# Patient Record
Sex: FEMALE | Race: WHITE | Employment: STUDENT | URBAN - NONMETROPOLITAN AREA
[De-identification: names, ages, dates, MRNs, and addresses within clinical notes are randomized per-mention and may not be internally consistent; named-entity substitution may affect disease eponyms.]

---
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
## 2022-01-05 PROBLEM — F41.1 GENERALIZED ANXIETY DISORDER: Status: ACTIVE | Noted: 2022-01-05

## 2022-01-05 PROBLEM — R00.0 TACHYCARDIA: Status: ACTIVE | Noted: 2018-11-29

## 2022-01-05 PROBLEM — E34.9 ENDOCRINE DISORDER: Status: ACTIVE | Noted: 2018-04-17

## 2022-01-05 PROBLEM — F64.0 TRANSSEXUALISM: Status: ACTIVE | Noted: 2019-11-04

## 2022-01-05 PROBLEM — F98.8 ATTENTION DEFICIT DISORDER: Status: ACTIVE | Noted: 2022-01-05

## 2022-01-05 PROBLEM — R45.851 SUICIDAL IDEATION: Status: ACTIVE | Noted: 2022-01-05

## 2023-01-09 ENCOUNTER — OFFICE VISIT (OUTPATIENT)
Dept: PRIMARY CARE CLINIC | Age: 23
End: 2023-01-09
Payer: COMMERCIAL

## 2023-01-09 VITALS
SYSTOLIC BLOOD PRESSURE: 130 MMHG | TEMPERATURE: 98.2 F | HEART RATE: 105 BPM | DIASTOLIC BLOOD PRESSURE: 80 MMHG | BODY MASS INDEX: 23.16 KG/M2 | OXYGEN SATURATION: 98 % | WEIGHT: 139 LBS | HEIGHT: 65 IN

## 2023-01-09 DIAGNOSIS — F41.1 GENERALIZED ANXIETY DISORDER: ICD-10-CM

## 2023-01-09 DIAGNOSIS — F33.1 MODERATE EPISODE OF RECURRENT MAJOR DEPRESSIVE DISORDER (HCC): ICD-10-CM

## 2023-01-09 DIAGNOSIS — Z13.31 POSITIVE DEPRESSION SCREENING: ICD-10-CM

## 2023-01-09 DIAGNOSIS — F90.9 ATTENTION DEFICIT HYPERACTIVITY DISORDER (ADHD), UNSPECIFIED ADHD TYPE: Primary | ICD-10-CM

## 2023-01-09 PROCEDURE — 99214 OFFICE O/P EST MOD 30 MIN: CPT | Performed by: FAMILY MEDICINE

## 2023-01-09 RX ORDER — DESVENLAFAXINE 25 MG/1
25 TABLET, EXTENDED RELEASE ORAL DAILY
Qty: 30 TABLET | Refills: 2 | Status: SHIPPED | OUTPATIENT
Start: 2023-01-09

## 2023-01-09 ASSESSMENT — PATIENT HEALTH QUESTIONNAIRE - PHQ9
10. IF YOU CHECKED OFF ANY PROBLEMS, HOW DIFFICULT HAVE THESE PROBLEMS MADE IT FOR YOU TO DO YOUR WORK, TAKE CARE OF THINGS AT HOME, OR GET ALONG WITH OTHER PEOPLE: 3
SUM OF ALL RESPONSES TO PHQ QUESTIONS 1-9: 21
2. FEELING DOWN, DEPRESSED OR HOPELESS: 3
4. FEELING TIRED OR HAVING LITTLE ENERGY: 3
6. FEELING BAD ABOUT YOURSELF - OR THAT YOU ARE A FAILURE OR HAVE LET YOURSELF OR YOUR FAMILY DOWN: 3
8. MOVING OR SPEAKING SO SLOWLY THAT OTHER PEOPLE COULD HAVE NOTICED. OR THE OPPOSITE, BEING SO FIGETY OR RESTLESS THAT YOU HAVE BEEN MOVING AROUND A LOT MORE THAN USUAL: 1
SUM OF ALL RESPONSES TO PHQ9 QUESTIONS 1 & 2: 6
9. THOUGHTS THAT YOU WOULD BE BETTER OFF DEAD, OR OF HURTING YOURSELF: 3
SUM OF ALL RESPONSES TO PHQ QUESTIONS 1-9: 21
1. LITTLE INTEREST OR PLEASURE IN DOING THINGS: 3
7. TROUBLE CONCENTRATING ON THINGS, SUCH AS READING THE NEWSPAPER OR WATCHING TELEVISION: 2
SUM OF ALL RESPONSES TO PHQ QUESTIONS 1-9: 18
3. TROUBLE FALLING OR STAYING ASLEEP: 2
5. POOR APPETITE OR OVEREATING: 1
SUM OF ALL RESPONSES TO PHQ QUESTIONS 1-9: 21

## 2023-01-09 ASSESSMENT — COLUMBIA-SUICIDE SEVERITY RATING SCALE - C-SSRS
1. WITHIN THE PAST MONTH, HAVE YOU WISHED YOU WERE DEAD OR WISHED YOU COULD GO TO SLEEP AND NOT WAKE UP?: YES
6. HAVE YOU EVER DONE ANYTHING, STARTED TO DO ANYTHING, OR PREPARED TO DO ANYTHING TO END YOUR LIFE?: NO
2. HAVE YOU ACTUALLY HAD ANY THOUGHTS OF KILLING YOURSELF?: NO

## 2023-01-10 ENCOUNTER — TELEPHONE (OUTPATIENT)
Dept: PRIMARY CARE CLINIC | Age: 23
End: 2023-01-10

## 2023-01-10 RX ORDER — PAROXETINE HYDROCHLORIDE 20 MG/1
20 TABLET, FILM COATED ORAL DAILY
Qty: 30 TABLET | Refills: 3 | Status: SHIPPED | OUTPATIENT
Start: 2023-01-10

## 2023-01-10 ASSESSMENT — ENCOUNTER SYMPTOMS
NAUSEA: 0
BACK PAIN: 0
ABDOMINAL PAIN: 0
DIARRHEA: 0
VOMITING: 0
WHEEZING: 0
COLOR CHANGE: 0
COUGH: 0
EYE DISCHARGE: 0

## 2023-01-10 NOTE — TELEPHONE ENCOUNTER
Mom states the new med for Anxiety has caused major reaction after taking 1 pill. Nausea, dizzy, severe mood swings, angry, sad, crying, jittery. Pt asking for different med.

## 2023-01-10 NOTE — PROGRESS NOTES
SUBJECTIVE:    Patient ID: Raissa Rojas is a 25 y.o. female. HPI:   Patient is seen today for problems with depression. He is currently taking Adderall for ADHD. He has been on Cymbalta in the past but states that he came off of it because he did not feel it was anything for him and he had a lot of night sweats. He states that he feels down more significantly over the past couple of months. He states that he has not had any suicidal thoughts or ideation but he just feels like he is down and depressed. He states that he just does not have much motivation to do things and does not feel like he enjoys things like he used to. He is currently working at [employer] which he enjoys. He states that he is still taking his testosterone injections and as well as his Depo. Past Medical History:   Diagnosis Date    Anxiety     Depression     Personality disorder, depressive     Self mutilating behavior       Current Outpatient Medications on File Prior to Visit   Medication Sig Dispense Refill    amphetamine-dextroamphetamine (ADDERALL XR) 30 MG extended release capsule Take one capsule daily 90 capsule 0    B-D 3CC LUER-IRMA SYR 22GX1\" 22G X 1\" 3 ML MISC       ibuprofen (ADVIL;MOTRIN) 800 MG tablet Take 1 tablet by mouth 3 times daily as needed for Pain 90 tablet 0    medroxyPROGESTERone (DEPO-PROVERA) 150 MG/ML injection Inject 150 mg into the muscle every 3 months      Syringe, Disposable, 3 ML MISC Use as directed for monthly testosterone injections. Please dispense with 1 inch needle      testosterone cypionate (DEPOTESTOTERONE CYPIONATE) 200 MG/ML injection Inject 50 mg into the muscle every 14 days. Pink Brandon UNABLE TO FIND Take  by mouth daily. Yesika Thorpe, Vegetable supplement       No current facility-administered medications on file prior to visit.      Allergies   Allergen Reactions    Prozac [Fluoxetine] Other (See Comments)     hallucinations    Milk-Related Compounds     Wheat Dextrin     Zoloft [Sertraline Hcl] Other (See Comments)     Mother states that it made her daughter paranoid. Review of Systems   Constitutional:  Negative for activity change, appetite change and fever. HENT:  Negative for congestion and nosebleeds. Eyes:  Negative for discharge. Respiratory:  Negative for cough and wheezing. Cardiovascular:  Negative for chest pain and leg swelling. Gastrointestinal:  Negative for abdominal pain, diarrhea, nausea and vomiting. Genitourinary:  Negative for difficulty urinating, frequency and urgency. Musculoskeletal:  Negative for back pain and gait problem. Skin:  Negative for color change and rash. Neurological:  Negative for dizziness and headaches. Hematological:  Does not bruise/bleed easily. Psychiatric/Behavioral:  Negative for sleep disturbance and suicidal ideas. OBJECTIVE:    Physical Exam  Vitals reviewed. Constitutional:       General: She is not in acute distress. Appearance: Normal appearance. She is well-developed. She is not diaphoretic. HENT:      Head: Normocephalic and atraumatic. Right Ear: External ear normal.      Left Ear: External ear normal.   Cardiovascular:      Rate and Rhythm: Normal rate and regular rhythm. Pulses: Normal pulses. Heart sounds: Normal heart sounds. No murmur heard. Pulmonary:      Effort: Pulmonary effort is normal. No respiratory distress. Breath sounds: Normal breath sounds. Musculoskeletal:      Cervical back: Normal range of motion and neck supple. Skin:     General: Skin is warm and dry. Neurological:      General: No focal deficit present. Mental Status: She is alert and oriented to person, place, and time. Mental status is at baseline. Psychiatric:         Mood and Affect: Mood normal.         Behavior: Behavior normal.         Thought Content:  Thought content normal.         Judgment: Judgment normal.      /80   Pulse (!) 105   Temp 98.2 °F (36.8 °C)   Ht 5' 5\" (1.651 m) Wt 139 lb (63 kg)   LMP 01/02/2023   SpO2 98%   BMI 23.13 kg/m²      ASSESSMENT/PLAN:    1. Attention deficit hyperactivity disorder (ADHD), unspecified ADHD type  2. Positive depression screening  3. Generalized anxiety disorder  4. Moderate episode of recurrent major depressive disorder (HCC)     We are going to start her on Pristiq. I have encouraged her to let me know we are going to start him on Pristiq. I encouraged him to let me know if he feels like his symptoms are worsening otherwise I would like to see him back in about 4 weeks for follow-up. Continue Adderall XR for ADHD. I have encouraged him to continue to follow-up with counseling. PDMP Monitoring:    Last PDMP Haritha Peterson as Reviewed [facility]):  Review User Review Instant Review Result   Gwendolyn Backer 12/27/2022 12:32 PM Reviewed PDMP [1]       Urine Drug Screenings (1 yr)    No resulted procedures found. Medication Contract and Consent for Opioid Use Documents Filed        No documents found                     EMR Dragon/transcription disclaimer:  Much of this encounter note is electronic transcription/translation of spoken language toprinted texts. The electronic translation of spoken language may be erroneous, or at times, nonsensical words or phrases may be inadvertently transcribed. Although I have reviewed the note for such errors, some may stillexist.PHQ-9 score today: (PHQ-9 Total Score: 21), additional evaluation and assessment performed, follow-up plan includes but not limited to: Medication management and Referral to /Specialist  for evaluation and management.

## 2023-01-19 ENCOUNTER — TELEPHONE (OUTPATIENT)
Dept: PRIMARY CARE CLINIC | Age: 23
End: 2023-01-19

## 2023-01-19 NOTE — TELEPHONE ENCOUNTER
[employer] states the Gene sight you wanted is not covered without a peer to peer if you want but not required.  [phone]

## 2023-01-20 ENCOUNTER — TELEPHONE (OUTPATIENT)
Dept: PRIMARY CARE CLINIC | Age: 23
End: 2023-01-20

## 2023-01-20 NOTE — TELEPHONE ENCOUNTER
Joie from [facility] [employer] wanted to notify you that insurance will not cover the genesight test that was done on the patient due to it still being considered experimental.

## 2023-01-30 ENCOUNTER — OFFICE VISIT (OUTPATIENT)
Dept: PRIMARY CARE CLINIC | Age: 23
End: 2023-01-30
Payer: COMMERCIAL

## 2023-01-30 VITALS
OXYGEN SATURATION: 100 % | DIASTOLIC BLOOD PRESSURE: 86 MMHG | HEART RATE: 115 BPM | TEMPERATURE: 98.2 F | SYSTOLIC BLOOD PRESSURE: 118 MMHG | HEIGHT: 65 IN | BODY MASS INDEX: 22.66 KG/M2 | WEIGHT: 136 LBS

## 2023-01-30 DIAGNOSIS — F90.9 ATTENTION DEFICIT HYPERACTIVITY DISORDER (ADHD), UNSPECIFIED ADHD TYPE: Primary | ICD-10-CM

## 2023-01-30 DIAGNOSIS — F33.1 MODERATE EPISODE OF RECURRENT MAJOR DEPRESSIVE DISORDER (HCC): ICD-10-CM

## 2023-01-30 DIAGNOSIS — F41.1 GENERALIZED ANXIETY DISORDER: ICD-10-CM

## 2023-01-30 DIAGNOSIS — Z13.31 POSITIVE DEPRESSION SCREENING: ICD-10-CM

## 2023-01-30 PROCEDURE — 99213 OFFICE O/P EST LOW 20 MIN: CPT | Performed by: FAMILY MEDICINE

## 2023-01-30 RX ORDER — VILAZODONE HYDROCHLORIDE 10 MG-20MG
KIT ORAL
Qty: 1 KIT | Refills: 0 | Status: SHIPPED | OUTPATIENT
Start: 2023-01-30

## 2023-01-30 ASSESSMENT — PATIENT HEALTH QUESTIONNAIRE - PHQ9
SUM OF ALL RESPONSES TO PHQ QUESTIONS 1-9: 0
SUM OF ALL RESPONSES TO PHQ QUESTIONS 1-9: 22
9. THOUGHTS THAT YOU WOULD BE BETTER OFF DEAD, OR OF HURTING YOURSELF: 3
5. POOR APPETITE OR OVEREATING: 2
SUM OF ALL RESPONSES TO PHQ9 QUESTIONS 1 & 2: 0
10. IF YOU CHECKED OFF ANY PROBLEMS, HOW DIFFICULT HAVE THESE PROBLEMS MADE IT FOR YOU TO DO YOUR WORK, TAKE CARE OF THINGS AT HOME, OR GET ALONG WITH OTHER PEOPLE: 3
2. FEELING DOWN, DEPRESSED OR HOPELESS: 3
1. LITTLE INTEREST OR PLEASURE IN DOING THINGS: 0
SUM OF ALL RESPONSES TO PHQ QUESTIONS 1-9: 22
SUM OF ALL RESPONSES TO PHQ QUESTIONS 1-9: 0
4. FEELING TIRED OR HAVING LITTLE ENERGY: 3
SUM OF ALL RESPONSES TO PHQ9 QUESTIONS 1 & 2: 6
3. TROUBLE FALLING OR STAYING ASLEEP: 3
SUM OF ALL RESPONSES TO PHQ QUESTIONS 1-9: 19
2. FEELING DOWN, DEPRESSED OR HOPELESS: 0
SUM OF ALL RESPONSES TO PHQ QUESTIONS 1-9: 22
6. FEELING BAD ABOUT YOURSELF - OR THAT YOU ARE A FAILURE OR HAVE LET YOURSELF OR YOUR FAMILY DOWN: 2
SUM OF ALL RESPONSES TO PHQ QUESTIONS 1-9: 0
1. LITTLE INTEREST OR PLEASURE IN DOING THINGS: 3
8. MOVING OR SPEAKING SO SLOWLY THAT OTHER PEOPLE COULD HAVE NOTICED. OR THE OPPOSITE, BEING SO FIGETY OR RESTLESS THAT YOU HAVE BEEN MOVING AROUND A LOT MORE THAN USUAL: 1
SUM OF ALL RESPONSES TO PHQ QUESTIONS 1-9: 0
7. TROUBLE CONCENTRATING ON THINGS, SUCH AS READING THE NEWSPAPER OR WATCHING TELEVISION: 2

## 2023-01-30 ASSESSMENT — COLUMBIA-SUICIDE SEVERITY RATING SCALE - C-SSRS
2. HAVE YOU ACTUALLY HAD ANY THOUGHTS OF KILLING YOURSELF?: NO
1. WITHIN THE PAST MONTH, HAVE YOU WISHED YOU WERE DEAD OR WISHED YOU COULD GO TO SLEEP AND NOT WAKE UP?: YES
6. HAVE YOU EVER DONE ANYTHING, STARTED TO DO ANYTHING, OR PREPARED TO DO ANYTHING TO END YOUR LIFE?: NO

## 2023-02-01 ASSESSMENT — ENCOUNTER SYMPTOMS
ABDOMINAL PAIN: 0
VOMITING: 0
WHEEZING: 0
NAUSEA: 0
DIARRHEA: 0
BACK PAIN: 0
EYE DISCHARGE: 0
COUGH: 0
COLOR CHANGE: 0

## 2023-02-01 NOTE — PROGRESS NOTES
SUBJECTIVE:    Patient ID: Willem Ndiaye is a 22 y.o. female.    HPI:   Patient is seen today for medication follow-up.  He is currently doing Paxil.  He does feel like there is been some improvement in the mood with Paxil but he does feel like the fatigue is gotten significantly worse.  He is tolerating the Paxil well other than the fatigue.  He states that he has not had any suicidal thoughts or ideation.  He states that heWould like to try something different if possible because the fatigue is very overwhelming.  He has tried multiple other SSRIs in the past without any improvement in his symptoms and has not like the way he has felt on these previously.    Past Medical History:   Diagnosis Date    Anxiety     Depression     Personality disorder, depressive     Self mutilating behavior       Current Outpatient Medications on File Prior to Visit   Medication Sig Dispense Refill    amphetamine-dextroamphetamine (ADDERALL XR) 30 MG extended release capsule Take one capsule daily 90 capsule 0    B-D 3CC LUER-IRMA SYR 22GX1\" 22G X 1\" 3 ML MISC       ibuprofen (ADVIL;MOTRIN) 800 MG tablet Take 1 tablet by mouth 3 times daily as needed for Pain 90 tablet 0    medroxyPROGESTERone (DEPO-PROVERA) 150 MG/ML injection Inject 150 mg into the muscle every 3 months      Syringe, Disposable, 3 ML MISC Use as directed for monthly testosterone injections.  Please dispense with 1 inch needle      testosterone cypionate (DEPOTESTOTERONE CYPIONATE) 200 MG/ML injection Inject 50 mg into the muscle every 14 days..      UNABLE TO FIND Take  by mouth daily. Dyllan, Vegetable supplement       No current facility-administered medications on file prior to visit.     Allergies   Allergen Reactions    Prozac [Fluoxetine] Other (See Comments)     hallucinations    Milk-Related Compounds     Pristiq [Desvenlafaxine] Other (See Comments)     Mood was worse    Wheat Dextrin     Zoloft [Sertraline Hcl] Other (See Comments)     Mother states that  it made her daughter paranoid. Review of Systems   Constitutional:  Positive for fatigue. Negative for activity change, appetite change and fever. HENT:  Negative for congestion and nosebleeds. Eyes:  Negative for discharge. Respiratory:  Negative for cough and wheezing. Cardiovascular:  Negative for chest pain and leg swelling. Gastrointestinal:  Negative for abdominal pain, diarrhea, nausea and vomiting. Genitourinary:  Negative for difficulty urinating, frequency and urgency. Musculoskeletal:  Negative for back pain and gait problem. Skin:  Negative for color change and rash. Neurological:  Negative for dizziness and headaches. Hematological:  Does not bruise/bleed easily. Psychiatric/Behavioral:  Positive for dysphoric mood. Negative for sleep disturbance and suicidal ideas. The patient is nervous/anxious. OBJECTIVE:    Physical Exam  Vitals reviewed. Constitutional:       General: She is not in acute distress. Appearance: Normal appearance. She is well-developed. She is not diaphoretic. HENT:      Head: Normocephalic and atraumatic. Right Ear: External ear normal.      Left Ear: External ear normal.   Cardiovascular:      Rate and Rhythm: Normal rate and regular rhythm. Pulses: Normal pulses. Heart sounds: Normal heart sounds. No murmur heard. Pulmonary:      Effort: Pulmonary effort is normal. No respiratory distress. Breath sounds: Normal breath sounds. Musculoskeletal:      Cervical back: Normal range of motion and neck supple. Skin:     General: Skin is warm and dry. Neurological:      General: No focal deficit present. Mental Status: She is alert and oriented to person, place, and time. Mental status is at baseline. Psychiatric:         Mood and Affect: Mood normal.         Behavior: Behavior normal.         Thought Content:  Thought content normal.         Judgment: Judgment normal.      /86   Pulse (!) 115   Temp 98.2 °F (36.8 °C)   Ht 5' 5\" (1.651 m)   Wt 136 lb (61.7 kg)   LMP 01/02/2023   SpO2 100%   BMI 22.63 kg/m²      ASSESSMENT/PLAN:    1. Attention deficit hyperactivity disorder (ADHD), unspecified ADHD type  2. Positive depression screening  3. Generalized anxiety disorder  4. Moderate episode of recurrent major depressive disorder (HCC)     We are going to stop the Paxil and start Viibryd. I am going to send this to the pharmacy for him. If he feels like this is not helping he is to let us know. If he has problems with the medication sooner he is to let us know that as well. Follow-up with usIn 4 weeks for follow-up unless needed sooner. PDMP Monitoring:    Last PDMP [place] [facility] as Reviewed [facility]):  Review User Review Instant Review Result   Giovani Certain 12/27/2022 12:32 PM Reviewed PDMP [1]       Urine Drug Screenings (1 yr)    No resulted procedures found. Medication Contract and Consent for Opioid Use Documents Filed        No documents found                     EMR Dragon/transcription disclaimer:  Much of this encounter note is electronic transcription/translation of spoken language toprinted texts. The electronic translation of spoken language may be erroneous, or at times, nonsensical words or phrases may be inadvertently transcribed. Although I have reviewed the note for such errors, some may stillexist.PHQ-9 score today: (PHQ-9 Total Score: 22), additional evaluation and assessment performed, follow-up plan includes but not limited to: Medication management and Referral to /Specialist  for evaluation and management.

## 2023-03-20 ENCOUNTER — OFFICE VISIT (OUTPATIENT)
Dept: PRIMARY CARE CLINIC | Age: 23
End: 2023-03-20
Payer: COMMERCIAL

## 2023-03-20 VITALS
DIASTOLIC BLOOD PRESSURE: 60 MMHG | HEIGHT: 65 IN | WEIGHT: 140.4 LBS | RESPIRATION RATE: 16 BRPM | OXYGEN SATURATION: 98 % | SYSTOLIC BLOOD PRESSURE: 108 MMHG | BODY MASS INDEX: 23.39 KG/M2 | HEART RATE: 94 BPM | TEMPERATURE: 97.3 F

## 2023-03-20 DIAGNOSIS — F41.1 GENERALIZED ANXIETY DISORDER: ICD-10-CM

## 2023-03-20 DIAGNOSIS — F90.9 ATTENTION DEFICIT HYPERACTIVITY DISORDER (ADHD), UNSPECIFIED ADHD TYPE: Primary | ICD-10-CM

## 2023-03-20 DIAGNOSIS — F33.1 MODERATE EPISODE OF RECURRENT MAJOR DEPRESSIVE DISORDER (HCC): ICD-10-CM

## 2023-03-20 DIAGNOSIS — Z13.31 POSITIVE DEPRESSION SCREENING: ICD-10-CM

## 2023-03-20 PROCEDURE — 99214 OFFICE O/P EST MOD 30 MIN: CPT | Performed by: FAMILY MEDICINE

## 2023-03-20 RX ORDER — DEXTROAMPHETAMINE SACCHARATE, AMPHETAMINE ASPARTATE MONOHYDRATE, DEXTROAMPHETAMINE SULFATE AND AMPHETAMINE SULFATE 7.5; 7.5; 7.5; 7.5 MG/1; MG/1; MG/1; MG/1
CAPSULE, EXTENDED RELEASE ORAL
Qty: 90 CAPSULE | Refills: 0 | Status: SHIPPED | OUTPATIENT
Start: 2023-03-20 | End: 2023-08-14

## 2023-03-20 SDOH — ECONOMIC STABILITY: FOOD INSECURITY: WITHIN THE PAST 12 MONTHS, THE FOOD YOU BOUGHT JUST DIDN'T LAST AND YOU DIDN'T HAVE MONEY TO GET MORE.: NEVER TRUE

## 2023-03-20 SDOH — ECONOMIC STABILITY: INCOME INSECURITY: HOW HARD IS IT FOR YOU TO PAY FOR THE VERY BASICS LIKE FOOD, HOUSING, MEDICAL CARE, AND HEATING?: NOT HARD AT ALL

## 2023-03-20 SDOH — ECONOMIC STABILITY: FOOD INSECURITY: WITHIN THE PAST 12 MONTHS, YOU WORRIED THAT YOUR FOOD WOULD RUN OUT BEFORE YOU GOT MONEY TO BUY MORE.: NEVER TRUE

## 2023-03-20 SDOH — ECONOMIC STABILITY: HOUSING INSECURITY
IN THE LAST 12 MONTHS, WAS THERE A TIME WHEN YOU DID NOT HAVE A STEADY PLACE TO SLEEP OR SLEPT IN A SHELTER (INCLUDING NOW)?: NO

## 2023-03-20 ASSESSMENT — PATIENT HEALTH QUESTIONNAIRE - PHQ9
5. POOR APPETITE OR OVEREATING: 1
3. TROUBLE FALLING OR STAYING ASLEEP: 3
4. FEELING TIRED OR HAVING LITTLE ENERGY: 1
10. IF YOU CHECKED OFF ANY PROBLEMS, HOW DIFFICULT HAVE THESE PROBLEMS MADE IT FOR YOU TO DO YOUR WORK, TAKE CARE OF THINGS AT HOME, OR GET ALONG WITH OTHER PEOPLE: 3
SUM OF ALL RESPONSES TO PHQ QUESTIONS 1-9: 17
SUM OF ALL RESPONSES TO PHQ9 QUESTIONS 1 & 2: 6
2. FEELING DOWN, DEPRESSED OR HOPELESS: 3
9. THOUGHTS THAT YOU WOULD BE BETTER OFF DEAD, OR OF HURTING YOURSELF: 1
SUM OF ALL RESPONSES TO PHQ QUESTIONS 1-9: 17
SUM OF ALL RESPONSES TO PHQ QUESTIONS 1-9: 17
1. LITTLE INTEREST OR PLEASURE IN DOING THINGS: 3
8. MOVING OR SPEAKING SO SLOWLY THAT OTHER PEOPLE COULD HAVE NOTICED. OR THE OPPOSITE, BEING SO FIGETY OR RESTLESS THAT YOU HAVE BEEN MOVING AROUND A LOT MORE THAN USUAL: 1
7. TROUBLE CONCENTRATING ON THINGS, SUCH AS READING THE NEWSPAPER OR WATCHING TELEVISION: 1
6. FEELING BAD ABOUT YOURSELF - OR THAT YOU ARE A FAILURE OR HAVE LET YOURSELF OR YOUR FAMILY DOWN: 3
SUM OF ALL RESPONSES TO PHQ QUESTIONS 1-9: 16

## 2023-03-20 ASSESSMENT — ENCOUNTER SYMPTOMS
COLOR CHANGE: 0
DIARRHEA: 0
VOMITING: 0
ABDOMINAL PAIN: 0
COUGH: 0
NAUSEA: 0
EYE DISCHARGE: 0
BACK PAIN: 0
WHEEZING: 0

## 2023-03-20 ASSESSMENT — COLUMBIA-SUICIDE SEVERITY RATING SCALE - C-SSRS
2. HAVE YOU ACTUALLY HAD ANY THOUGHTS OF KILLING YOURSELF?: NO
6. HAVE YOU EVER DONE ANYTHING, STARTED TO DO ANYTHING, OR PREPARED TO DO ANYTHING TO END YOUR LIFE?: NO
1. WITHIN THE PAST MONTH, HAVE YOU WISHED YOU WERE DEAD OR WISHED YOU COULD GO TO SLEEP AND NOT WAKE UP?: YES

## 2023-03-20 NOTE — PROGRESS NOTES
SUBJECTIVE:    Patient ID: Nancy Gage is a 25 y.o. female. HPI:   Patient is seen today for 6-month follow-up. He has a history of ADHD and is on Adderall XR. He is tolerating this well. He is struggling with his depression. We have tried to send a Viibryd starter pack but insurance would not cover it. On review of the PA it said patient not found in her insurance. We are going to try to resend this and try to resubmit the PA if possible. He has tried multiple other antidepressants including Prozac, Paxil, Effexor, Wellbutrin, Pristiq, Lexapro, Cymbalta and a couple of others without any improvement in symptoms and has not tolerated them well. He is just struggling and feels down most of the time. Past Medical History:   Diagnosis Date    Anxiety     Depression     Personality disorder, depressive     Self mutilating behavior       Current Outpatient Medications on File Prior to Visit   Medication Sig Dispense Refill    B-D 3CC LUER-IRMA SYR 22GX1\" 22G X 1\" 3 ML MISC       medroxyPROGESTERone (DEPO-PROVERA) 150 MG/ML injection Inject 150 mg into the muscle every 3 months      Syringe, Disposable, 3 ML MISC Use as directed for monthly testosterone injections. Please dispense with 1 inch needle      testosterone cypionate (DEPOTESTOTERONE CYPIONATE) 200 MG/ML injection Inject 50 mg into the muscle every 14 days. .       No current facility-administered medications on file prior to visit. Allergies   Allergen Reactions    Prozac [Fluoxetine] Other (See Comments)     hallucinations    Milk-Related Compounds     Pristiq [Desvenlafaxine] Other (See Comments)     Mood was worse    Wheat Dextrin     Zoloft [Sertraline Hcl] Other (See Comments)     Mother states that it made her daughter paranoid. Review of Systems   Constitutional:  Negative for activity change, appetite change and fever. HENT:  Negative for congestion and nosebleeds. Eyes:  Negative for discharge.    Respiratory:

## 2023-03-20 NOTE — PROGRESS NOTES
PHQ-9 score today: (PHQ-9 Total Score: 17), additional evaluation and assessment performed, follow-up plan includes but not limited to: Medication management and Referral to /Specialist  for evaluation and management.

## 2023-05-08 ENCOUNTER — OFFICE VISIT (OUTPATIENT)
Dept: PRIMARY CARE CLINIC | Age: 23
End: 2023-05-08

## 2023-05-08 VITALS
HEART RATE: 71 BPM | DIASTOLIC BLOOD PRESSURE: 72 MMHG | OXYGEN SATURATION: 98 % | WEIGHT: 141 LBS | BODY MASS INDEX: 23.49 KG/M2 | TEMPERATURE: 97 F | HEIGHT: 65 IN | RESPIRATION RATE: 16 BRPM | SYSTOLIC BLOOD PRESSURE: 124 MMHG

## 2023-05-08 DIAGNOSIS — F90.9 ATTENTION DEFICIT HYPERACTIVITY DISORDER (ADHD), UNSPECIFIED ADHD TYPE: ICD-10-CM

## 2023-05-08 DIAGNOSIS — Z79.899 MEDICATION MANAGEMENT: ICD-10-CM

## 2023-05-08 DIAGNOSIS — Z13.31 POSITIVE DEPRESSION SCREENING: Primary | ICD-10-CM

## 2023-05-08 DIAGNOSIS — F33.1 MODERATE EPISODE OF RECURRENT MAJOR DEPRESSIVE DISORDER (HCC): ICD-10-CM

## 2023-05-08 RX ORDER — VILAZODONE HYDROCHLORIDE 10 MG-20MG
KIT ORAL
Qty: 1 KIT | Refills: 0 | Status: SHIPPED | OUTPATIENT
Start: 2023-05-08

## 2023-05-08 ASSESSMENT — PATIENT HEALTH QUESTIONNAIRE - PHQ9
4. FEELING TIRED OR HAVING LITTLE ENERGY: 3
1. LITTLE INTEREST OR PLEASURE IN DOING THINGS: 3
SUM OF ALL RESPONSES TO PHQ QUESTIONS 1-9: 20
SUM OF ALL RESPONSES TO PHQ QUESTIONS 1-9: 20
SUM OF ALL RESPONSES TO PHQ9 QUESTIONS 1 & 2: 6
2. FEELING DOWN, DEPRESSED OR HOPELESS: 3
8. MOVING OR SPEAKING SO SLOWLY THAT OTHER PEOPLE COULD HAVE NOTICED. OR THE OPPOSITE, BEING SO FIGETY OR RESTLESS THAT YOU HAVE BEEN MOVING AROUND A LOT MORE THAN USUAL: 1
9. THOUGHTS THAT YOU WOULD BE BETTER OFF DEAD, OR OF HURTING YOURSELF: 0
6. FEELING BAD ABOUT YOURSELF - OR THAT YOU ARE A FAILURE OR HAVE LET YOURSELF OR YOUR FAMILY DOWN: 3
SUM OF ALL RESPONSES TO PHQ QUESTIONS 1-9: 20
10. IF YOU CHECKED OFF ANY PROBLEMS, HOW DIFFICULT HAVE THESE PROBLEMS MADE IT FOR YOU TO DO YOUR WORK, TAKE CARE OF THINGS AT HOME, OR GET ALONG WITH OTHER PEOPLE: 3
7. TROUBLE CONCENTRATING ON THINGS, SUCH AS READING THE NEWSPAPER OR WATCHING TELEVISION: 3
SUM OF ALL RESPONSES TO PHQ QUESTIONS 1-9: 20
5. POOR APPETITE OR OVEREATING: 2
3. TROUBLE FALLING OR STAYING ASLEEP: 2

## 2023-05-08 ASSESSMENT — COLUMBIA-SUICIDE SEVERITY RATING SCALE - C-SSRS
2. HAVE YOU ACTUALLY HAD ANY THOUGHTS OF KILLING YOURSELF?: NO
6. HAVE YOU EVER DONE ANYTHING, STARTED TO DO ANYTHING, OR PREPARED TO DO ANYTHING TO END YOUR LIFE?: NO
1. WITHIN THE PAST MONTH, HAVE YOU WISHED YOU WERE DEAD OR WISHED YOU COULD GO TO SLEEP AND NOT WAKE UP?: NO

## 2023-05-08 NOTE — PROGRESS NOTES
SUBJECTIVE:    Patient ID: Trev Kilgore is a 25 y.o. female. HPI:   Patient is seen today for problems with anxiety and depression. He feels like his symptoms of gotten worse. He is not currently on anything for depression and feels like he needs something. He is not currently suicidal.  He states that he just feels down and does not have much motivation and does not have any yarely. He is taking Vyvanse but really cannot tell that he is taking anything. He is currently on the 20 mg. He states that he is tolerating this well and denies any side effects to it. He would be interested in increasing the dose. He was on Adderall XR 30 previously. Past Medical History:   Diagnosis Date    ADHD (attention deficit hyperactivity disorder)     Anxiety     Depression     Personality disorder, depressive     Self mutilating behavior       Current Outpatient Medications on File Prior to Visit   Medication Sig Dispense Refill    B-D 3CC LUER-IRMA SYR 22GX1\" 22G X 1\" 3 ML MISC       medroxyPROGESTERone (DEPO-PROVERA) 150 MG/ML injection Inject 1 mL into the muscle every 3 months      Syringe, Disposable, 3 ML MISC Use as directed for monthly testosterone injections. Please dispense with 1 inch needle      testosterone cypionate (DEPOTESTOTERONE CYPIONATE) 200 MG/ML injection Inject 0.25 mLs into the muscle every 14 days. Vilazodone HCl 10 & 20 MG KIT Take as directed (Patient not taking: Reported on 5/8/2023) 1 kit 0    amphetamine-dextroamphetamine (ADDERALL XR) 30 MG extended release capsule Take one capsule daily (Patient not taking: Reported on 5/8/2023) 90 capsule 0     No current facility-administered medications on file prior to visit.      Allergies   Allergen Reactions    Prozac [Fluoxetine] Other (See Comments)     hallucinations    Milk-Related Compounds     Pristiq [Desvenlafaxine] Other (See Comments)     Mood was worse    Wheat Dextrin     Zoloft [Sertraline Hcl] Other (See Comments)     Mother

## 2023-05-09 ASSESSMENT — ENCOUNTER SYMPTOMS
BACK PAIN: 0
VOMITING: 0
NAUSEA: 0
ABDOMINAL PAIN: 0
COLOR CHANGE: 0
WHEEZING: 0
EYE DISCHARGE: 0
COUGH: 0
DIARRHEA: 0

## 2023-05-12 LAB
AMPHET CTO UR CFM-MCNC: POSITIVE NG/ML
ANNOTATION COMMENT IMP: NORMAL
BARBITURATES CTO UR CFM-MCNC: NEGATIVE NG/ML
BENZODIAZ CTO UR CFM-MCNC: NEGATIVE NG/ML
CANNABINOIDS CTO UR CFM-MCNC: POSITIVE NG/ML
COCAINE CTO UR CFM-MCNC: NEGATIVE NG/ML
CREAT UR-MCNC: 114.3 MG/DL (ref 20–400)
METHADONE CTO UR CFM-MCNC: NEGATIVE NG/ML
OPIATES CTO UR CFM-MCNC: NEGATIVE NG/ML
PCP CTO UR CFM-MCNC: NEGATIVE NG/ML
PROPOXYPH CTO UR CFM-MCNC: NEGATIVE NG/ML

## 2023-05-14 LAB
AMPHET UR-MCNC: 2562 NG/ML
MDA UR-MCNC: <200 NG/ML
MDEA UR-MCNC: <200 NG/ML
MDMA UR-MCNC: <200 NG/ML
METHAMPHET UR-MCNC: <200 NG/ML
PHENTERMINE, URINE, QUANTITATIVE: <200 NG/ML

## 2023-05-16 DIAGNOSIS — F90.9 ATTENTION DEFICIT HYPERACTIVITY DISORDER (ADHD), UNSPECIFIED ADHD TYPE: ICD-10-CM

## 2023-06-16 DIAGNOSIS — F90.9 ATTENTION DEFICIT HYPERACTIVITY DISORDER (ADHD), UNSPECIFIED ADHD TYPE: ICD-10-CM

## 2023-06-16 RX ORDER — LISDEXAMFETAMINE DIMESYLATE 40 MG/1
40 CAPSULE ORAL EVERY MORNING
Qty: 30 CAPSULE | Refills: 0 | Status: SHIPPED | OUTPATIENT
Start: 2023-06-16 | End: 2023-07-16

## 2023-07-18 RX ORDER — VILAZODONE HYDROCHLORIDE 20 MG/1
TABLET ORAL
Qty: 30 TABLET | Refills: 2 | Status: SHIPPED | OUTPATIENT
Start: 2023-07-18

## 2023-07-21 DIAGNOSIS — F90.9 ATTENTION DEFICIT HYPERACTIVITY DISORDER (ADHD), UNSPECIFIED ADHD TYPE: ICD-10-CM

## 2023-07-21 RX ORDER — LISDEXAMFETAMINE DIMESYLATE 40 MG/1
40 CAPSULE ORAL EVERY MORNING
Qty: 30 CAPSULE | Refills: 0 | Status: SHIPPED | OUTPATIENT
Start: 2023-07-21 | End: 2023-08-20

## 2023-07-21 NOTE — TELEPHONE ENCOUNTER
PDMP Monitoring:    Last PDMP Patsy Douglas as Reviewed [facility]):  Review User Review Instant Review Result   Cecille Mckay 6/16/2023  1:43 PM Reviewed PDMP [1]     Urine Drug Screenings (1 yr)       Drug Panel 9, Ur Screen w Reflx to Conf  Collected: 5/8/2023  4:02 PM (Final result)                  Medication Contract and Consent for Opioid Use Documents Filed        No documents found
Negative

## 2023-07-31 ENCOUNTER — OFFICE VISIT (OUTPATIENT)
Dept: PRIMARY CARE CLINIC | Age: 23
End: 2023-07-31
Payer: COMMERCIAL

## 2023-07-31 VITALS
DIASTOLIC BLOOD PRESSURE: 84 MMHG | HEART RATE: 111 BPM | BODY MASS INDEX: 23.26 KG/M2 | RESPIRATION RATE: 18 BRPM | SYSTOLIC BLOOD PRESSURE: 106 MMHG | TEMPERATURE: 98.9 F | WEIGHT: 139.6 LBS | OXYGEN SATURATION: 96 % | HEIGHT: 65 IN

## 2023-07-31 DIAGNOSIS — N94.6 DYSMENORRHEA: Primary | ICD-10-CM

## 2023-07-31 DIAGNOSIS — N92.6 IRREGULAR MENSES: ICD-10-CM

## 2023-07-31 PROCEDURE — 99212 OFFICE O/P EST SF 10 MIN: CPT | Performed by: FAMILY MEDICINE

## 2023-07-31 RX ORDER — POLYETHYLENE GLYCOL 3350
17 POWDER (GRAM) MISCELLANEOUS DAILY
COMMUNITY
Start: 2023-07-27 | End: 2023-07-31

## 2023-07-31 RX ORDER — PSEUDOEPHEDRINE HCL 30 MG
100 TABLET ORAL 2 TIMES DAILY PRN
COMMUNITY
Start: 2023-07-27 | End: 2023-07-31

## 2023-08-01 ASSESSMENT — ENCOUNTER SYMPTOMS
EYE DISCHARGE: 0
BACK PAIN: 0
DIARRHEA: 0
COLOR CHANGE: 0
ABDOMINAL PAIN: 0
COUGH: 0
NAUSEA: 0
VOMITING: 0
WHEEZING: 0

## 2023-08-01 NOTE — PROGRESS NOTES
SUBJECTIVE:    Patient ID: Yen Wesley is a 25 y.o. female. HPI:   Patient is seen today for complaints of painful menses and irregular menses. He is currently on Depo and is seeing a specialist in [place] for transgender issues. He is currently on Depo and testosterone. He has not had a transvaginal ultrasound done recently. He has not had a work-up done from a GYN standpoint in the last couple of years. He states that he does not have a gynecologist that he sees. Past Medical History:   Diagnosis Date    ADHD (attention deficit hyperactivity disorder)     Anxiety     Depression     Personality disorder, depressive     Self mutilating behavior       Current Outpatient Medications on File Prior to Visit   Medication Sig Dispense Refill    Lisdexamfetamine Dimesylate (VYVANSE) 40 MG CAPS Take 40 mg by mouth every morning for 30 days. Max Daily Amount: 40 mg 30 capsule 0    B-D 3CC LUER-IRMA SYR 22GX1\" 22G X 1\" 3 ML MISC       medroxyPROGESTERone (DEPO-PROVERA) 150 MG/ML injection Inject 1 mL into the muscle every 3 months      testosterone cypionate (DEPOTESTOTERONE CYPIONATE) 200 MG/ML injection Inject 0.5 mLs into the muscle every 14 days. vilazodone HCl (VIIBRYD) 20 MG TABS TAKE 1/2 TABLET BY MOUTH DAILY FOR 7 DAYS THEN TAKE ONE TABLET BY MOUTH DAILY THEREAFTER 30 tablet 2    Vilazodone HCl (VIIBRYD STARTER PACK) 10 & 20 MG KIT Take as directed 1 kit 0    Vilazodone HCl 10 & 20 MG KIT Take as directed 1 kit 0    amphetamine-dextroamphetamine (ADDERALL XR) 30 MG extended release capsule Take one capsule daily (Patient not taking: Reported on 5/8/2023) 90 capsule 0    Syringe, Disposable, 3 ML MISC Use as directed for monthly testosterone injections. Please dispense with 1 inch needle       No current facility-administered medications on file prior to visit.      Allergies   Allergen Reactions    Prozac [Fluoxetine] Other (See Comments)     hallucinations    Milk-Related Compounds     Pristiq

## 2023-08-08 ENCOUNTER — HOSPITAL ENCOUNTER (OUTPATIENT)
Dept: ULTRASOUND IMAGING | Age: 23
Discharge: HOME | End: 2023-08-08
Payer: COMMERCIAL

## 2023-08-08 DIAGNOSIS — N94.6 DYSMENORRHEA: ICD-10-CM

## 2023-08-08 DIAGNOSIS — N92.6 IRREGULAR MENSES: ICD-10-CM

## 2023-08-08 PROCEDURE — 76830 TRANSVAGINAL US NON-OB: CPT

## 2023-08-30 DIAGNOSIS — F90.9 ATTENTION DEFICIT HYPERACTIVITY DISORDER (ADHD), UNSPECIFIED ADHD TYPE: ICD-10-CM

## 2023-08-30 RX ORDER — LISDEXAMFETAMINE DIMESYLATE 40 MG/1
40 CAPSULE ORAL EVERY MORNING
Qty: 30 CAPSULE | Refills: 0 | Status: SHIPPED | OUTPATIENT
Start: 2023-08-30 | End: 2023-09-29

## 2023-08-30 NOTE — TELEPHONE ENCOUNTER
PDMP Monitoring:    Last PDMP Fuentes Slade as Reviewed [facility]):  Review User Review Instant Review Result   Shadi Pepe 6/16/2023  1:43 PM Reviewed PDMP [1]     Urine Drug Screenings (1 yr)       Drug Panel 9, Ur Screen w Reflx to Conf  Collected: 5/8/2023  4:02 PM (Final result)                  Medication Contract and Consent for Opioid Use Documents Filed        No documents found

## 2023-09-26 DIAGNOSIS — F90.9 ATTENTION DEFICIT HYPERACTIVITY DISORDER (ADHD), UNSPECIFIED ADHD TYPE: ICD-10-CM

## 2023-09-26 RX ORDER — DEXTROAMPHETAMINE SACCHARATE, AMPHETAMINE ASPARTATE MONOHYDRATE, DEXTROAMPHETAMINE SULFATE AND AMPHETAMINE SULFATE 7.5; 7.5; 7.5; 7.5 MG/1; MG/1; MG/1; MG/1
CAPSULE, EXTENDED RELEASE ORAL
Qty: 90 CAPSULE | Refills: 0 | Status: SHIPPED | OUTPATIENT
Start: 2023-09-26 | End: 2024-02-20

## 2023-09-26 NOTE — TELEPHONE ENCOUNTER
Provider needs to review PDMP    PDMP Monitoring:    Last PDMP Truman Young as Reviewed [facility]):  Review User Review Instant Review Result   Neha Deavenkatesh 6/16/2023  1:43 PM Reviewed PDMP [1]     Urine Drug Screenings (1 yr)       Drug Panel 9, Ur Screen w Reflx to Conf  Collected: 5/8/2023  4:02 PM (Final result)                  Medication Contract and Consent for Opioid Use Documents Filed        No documents found

## 2024-01-04 ENCOUNTER — TELEMEDICINE (OUTPATIENT)
Dept: PRIMARY CARE CLINIC | Age: 24
End: 2024-01-04
Payer: COMMERCIAL

## 2024-01-04 DIAGNOSIS — F90.9 ATTENTION DEFICIT HYPERACTIVITY DISORDER (ADHD), UNSPECIFIED ADHD TYPE: ICD-10-CM

## 2024-01-04 PROCEDURE — 99212 OFFICE O/P EST SF 10 MIN: CPT | Performed by: FAMILY MEDICINE

## 2024-01-04 RX ORDER — DEXTROAMPHETAMINE SACCHARATE, AMPHETAMINE ASPARTATE MONOHYDRATE, DEXTROAMPHETAMINE SULFATE AND AMPHETAMINE SULFATE 7.5; 7.5; 7.5; 7.5 MG/1; MG/1; MG/1; MG/1
CAPSULE, EXTENDED RELEASE ORAL
Qty: 90 CAPSULE | Refills: 0 | Status: SHIPPED | OUTPATIENT
Start: 2024-01-04 | End: 2024-05-30

## 2024-01-04 ASSESSMENT — PATIENT HEALTH QUESTIONNAIRE - PHQ9
3. TROUBLE FALLING OR STAYING ASLEEP: 0
SUM OF ALL RESPONSES TO PHQ9 QUESTIONS 1 & 2: 0
10. IF YOU CHECKED OFF ANY PROBLEMS, HOW DIFFICULT HAVE THESE PROBLEMS MADE IT FOR YOU TO DO YOUR WORK, TAKE CARE OF THINGS AT HOME, OR GET ALONG WITH OTHER PEOPLE: 0
SUM OF ALL RESPONSES TO PHQ QUESTIONS 1-9: 0
4. FEELING TIRED OR HAVING LITTLE ENERGY: 0
5. POOR APPETITE OR OVEREATING: 0
SUM OF ALL RESPONSES TO PHQ QUESTIONS 1-9: 0
SUM OF ALL RESPONSES TO PHQ QUESTIONS 1-9: 0
7. TROUBLE CONCENTRATING ON THINGS, SUCH AS READING THE NEWSPAPER OR WATCHING TELEVISION: 0
8. MOVING OR SPEAKING SO SLOWLY THAT OTHER PEOPLE COULD HAVE NOTICED. OR THE OPPOSITE, BEING SO FIGETY OR RESTLESS THAT YOU HAVE BEEN MOVING AROUND A LOT MORE THAN USUAL: 0
9. THOUGHTS THAT YOU WOULD BE BETTER OFF DEAD, OR OF HURTING YOURSELF: 0
1. LITTLE INTEREST OR PLEASURE IN DOING THINGS: 0
6. FEELING BAD ABOUT YOURSELF - OR THAT YOU ARE A FAILURE OR HAVE LET YOURSELF OR YOUR FAMILY DOWN: 0
SUM OF ALL RESPONSES TO PHQ QUESTIONS 1-9: 0
2. FEELING DOWN, DEPRESSED OR HOPELESS: 0

## 2024-01-04 ASSESSMENT — ENCOUNTER SYMPTOMS
ABDOMINAL PAIN: 0
BACK PAIN: 0
DIARRHEA: 0
COUGH: 0
VOMITING: 0
COLOR CHANGE: 0
NAUSEA: 0
EYE DISCHARGE: 0
WHEEZING: 0

## 2024-01-04 NOTE — PROGRESS NOTES
Objective   Patient-Reported Vitals  Patient-Reported Weight: 145 lb  Patient-Reported Height: 5' 5''       Physical Exam  [INSTRUCTIONS:  \"[x]\" Indicates a positive item  \"[]\" Indicates a negative item  -- DELETE ALL ITEMS NOT EXAMINED]    Constitutional: [x] Appears well-developed and well-nourished [x] No apparent distress      [] Abnormal -     Mental status: [x] Alert and awake  [x] Oriented to person/place/time [x] Able to follow commands    [] Abnormal -     Eyes:   EOM    [x]  Normal    [] Abnormal -   Sclera  [x]  Normal    [] Abnormal -          Discharge [x]  None visible   [] Abnormal -     HENT: [x] Normocephalic, atraumatic  [] Abnormal -   [x] Mouth/Throat: Mucous membranes are moist    External Ears [x] Normal  [] Abnormal -    Neck: [x] No visualized mass [] Abnormal -     Pulmonary/Chest: [x] Respiratory effort normal   [x] No visualized signs of difficulty breathing or respiratory distress        [] Abnormal -      Musculoskeletal:   [x] Normal gait with no signs of ataxia         [x] Normal range of motion of neck        [] Abnormal -     Neurological:        [x] No Facial Asymmetry (Cranial nerve 7 motor function) (limited exam due to video visit)          [x] No gaze palsy        [] Abnormal -          Skin:        [x] No significant exanthematous lesions or discoloration noted on facial skin         [] Abnormal -            Psychiatric:       [x] Normal Affect [] Abnormal -        [x] No Hallucinations    Other pertinent observable physical exam findings:-             --Miracle Escobar MD

## 2024-01-29 ENCOUNTER — HOSPITAL ENCOUNTER (INPATIENT)
Age: 24
LOS: 3 days | Discharge: HOME | End: 2024-02-01
Attending: EMERGENCY MEDICINE | Admitting: PSYCHIATRY & NEUROLOGY
Payer: COMMERCIAL

## 2024-01-29 DIAGNOSIS — R44.3 HALLUCINATIONS: Primary | ICD-10-CM

## 2024-01-29 DIAGNOSIS — R45.851 DEPRESSION WITH SUICIDAL IDEATION: ICD-10-CM

## 2024-01-29 DIAGNOSIS — F32.A DEPRESSION WITH SUICIDAL IDEATION: ICD-10-CM

## 2024-01-29 PROBLEM — F29 PSYCHOSIS, UNSPECIFIED PSYCHOSIS TYPE (HCC): Status: ACTIVE | Noted: 2024-01-29

## 2024-01-29 LAB
ALBUMIN SERPL-MCNC: 4.7 G/DL (ref 3.5–5.2)
ALP SERPL-CCNC: 70 U/L (ref 35–104)
ALT SERPL-CCNC: 10 U/L (ref 5–33)
AMPHET UR QL SCN: POSITIVE
ANION GAP SERPL CALCULATED.3IONS-SCNC: 10 MMOL/L (ref 7–19)
APAP SERPL-MCNC: <5 UG/ML (ref 10–30)
AST SERPL-CCNC: 16 U/L (ref 5–32)
BACTERIA URNS QL MICRO: NEGATIVE /HPF
BARBITURATES UR QL SCN: NEGATIVE
BASOPHILS # BLD: 0 K/UL (ref 0–0.2)
BASOPHILS NFR BLD: 0.4 % (ref 0–1)
BENZODIAZ UR QL SCN: NEGATIVE
BILIRUB SERPL-MCNC: 1.5 MG/DL (ref 0.2–1.2)
BILIRUB UR QL STRIP: NEGATIVE
BUN SERPL-MCNC: 6 MG/DL (ref 6–20)
BUPRENORPHINE URINE: NEGATIVE
CALCIUM SERPL-MCNC: 9.7 MG/DL (ref 8.6–10)
CANNABINOIDS UR QL SCN: POSITIVE
CHLORIDE SERPL-SCNC: 103 MMOL/L (ref 98–111)
CLARITY UR: CLEAR
CO2 SERPL-SCNC: 28 MMOL/L (ref 22–29)
COCAINE UR QL SCN: NEGATIVE
COLOR UR: YELLOW
CREAT SERPL-MCNC: 0.8 MG/DL (ref 0.5–0.9)
CRYSTALS URNS MICRO: ABNORMAL /HPF
DRUG SCREEN COMMENT UR-IMP: ABNORMAL
EOSINOPHIL # BLD: 0.1 K/UL (ref 0–0.6)
EOSINOPHIL NFR BLD: 1.1 % (ref 0–5)
EPI CELLS #/AREA URNS AUTO: 5 /HPF (ref 0–5)
ERYTHROCYTE [DISTWIDTH] IN BLOOD BY AUTOMATED COUNT: 15.5 % (ref 11.5–14.5)
ETHANOLAMINE SERPL-MCNC: <10 MG/DL (ref 0–0.08)
FENTANYL SCREEN, URINE: NEGATIVE
GLUCOSE SERPL-MCNC: 104 MG/DL (ref 74–109)
GLUCOSE UR STRIP.AUTO-MCNC: NEGATIVE MG/DL
HCG SERPL QL: NEGATIVE
HCT VFR BLD AUTO: 41.3 % (ref 37–47)
HGB BLD-MCNC: 13.6 G/DL (ref 12–16)
HGB UR STRIP.AUTO-MCNC: NEGATIVE MG/L
HYALINE CASTS #/AREA URNS AUTO: 0 /HPF (ref 0–8)
IMM GRANULOCYTES # BLD: 0 K/UL
KETONES UR STRIP.AUTO-MCNC: NEGATIVE MG/DL
LEUKOCYTE ESTERASE UR QL STRIP.AUTO: ABNORMAL
LYMPHOCYTES # BLD: 2.2 K/UL (ref 1.1–4.5)
LYMPHOCYTES NFR BLD: 39.4 % (ref 20–40)
MCH RBC QN AUTO: 28.8 PG (ref 27–31)
MCHC RBC AUTO-ENTMCNC: 32.9 G/DL (ref 33–37)
MCV RBC AUTO: 87.3 FL (ref 81–99)
METHADONE UR QL SCN: NEGATIVE
METHAMPHETAMINE, URINE: NEGATIVE
MONOCYTES # BLD: 0.4 K/UL (ref 0–0.9)
MONOCYTES NFR BLD: 6.5 % (ref 0–10)
NEUTROPHILS # BLD: 2.9 K/UL (ref 1.5–7.5)
NEUTS SEG NFR BLD: 52.4 % (ref 50–65)
NITRITE UR QL STRIP.AUTO: NEGATIVE
OPIATES UR QL SCN: NEGATIVE
OXYCODONE UR QL SCN: NEGATIVE
PCP UR QL SCN: NEGATIVE
PH UR STRIP.AUTO: 7.5 [PH] (ref 5–8)
PLATELET # BLD AUTO: 311 K/UL (ref 130–400)
PMV BLD AUTO: 9.5 FL (ref 9.4–12.3)
POTASSIUM SERPL-SCNC: 3.6 MMOL/L (ref 3.5–5)
PROT SERPL-MCNC: 7.3 G/DL (ref 6.6–8.7)
PROT UR STRIP.AUTO-MCNC: NEGATIVE MG/DL
RBC # BLD AUTO: 4.73 M/UL (ref 4.2–5.4)
RBC #/AREA URNS AUTO: 4 /HPF (ref 0–4)
SALICYLATES SERPL-MCNC: <0.3 MG/DL (ref 3–10)
SARS-COV-2 RDRP RESP QL NAA+PROBE: NOT DETECTED
SODIUM SERPL-SCNC: 141 MMOL/L (ref 136–145)
SP GR UR STRIP.AUTO: 1 (ref 1–1.03)
TRICYCLIC, URINE: NEGATIVE
UROBILINOGEN UR STRIP.AUTO-MCNC: 0.2 E.U./DL
WBC # BLD AUTO: 5.5 K/UL (ref 4.8–10.8)
WBC #/AREA URNS AUTO: 8 /HPF (ref 0–5)

## 2024-01-29 PROCEDURE — 36415 COLL VENOUS BLD VENIPUNCTURE: CPT

## 2024-01-29 PROCEDURE — G0480 DRUG TEST DEF 1-7 CLASSES: HCPCS

## 2024-01-29 PROCEDURE — 99285 EMERGENCY DEPT VISIT HI MDM: CPT

## 2024-01-29 PROCEDURE — 82077 ASSAY SPEC XCP UR&BREATH IA: CPT

## 2024-01-29 PROCEDURE — 80143 DRUG ASSAY ACETAMINOPHEN: CPT

## 2024-01-29 PROCEDURE — 85025 COMPLETE CBC W/AUTO DIFF WBC: CPT

## 2024-01-29 PROCEDURE — 80179 DRUG ASSAY SALICYLATE: CPT

## 2024-01-29 PROCEDURE — 81001 URINALYSIS AUTO W/SCOPE: CPT

## 2024-01-29 PROCEDURE — 84703 CHORIONIC GONADOTROPIN ASSAY: CPT

## 2024-01-29 PROCEDURE — 6370000000 HC RX 637 (ALT 250 FOR IP): Performed by: PSYCHIATRY & NEUROLOGY

## 2024-01-29 PROCEDURE — 1240000000 HC EMOTIONAL WELLNESS R&B

## 2024-01-29 PROCEDURE — 87635 SARS-COV-2 COVID-19 AMP PRB: CPT

## 2024-01-29 PROCEDURE — 80053 COMPREHEN METABOLIC PANEL: CPT

## 2024-01-29 PROCEDURE — 80307 DRUG TEST PRSMV CHEM ANLYZR: CPT

## 2024-01-29 RX ORDER — ACETAMINOPHEN 325 MG/1
650 TABLET ORAL EVERY 4 HOURS PRN
Status: DISCONTINUED | OUTPATIENT
Start: 2024-01-29 | End: 2024-02-01 | Stop reason: HOSPADM

## 2024-01-29 RX ORDER — POLYETHYLENE GLYCOL 3350 17 G/17G
17 POWDER, FOR SOLUTION ORAL DAILY PRN
Status: DISCONTINUED | OUTPATIENT
Start: 2024-01-29 | End: 2024-02-01 | Stop reason: HOSPADM

## 2024-01-29 RX ORDER — MECOBALAMIN 5000 MCG
5 TABLET,DISINTEGRATING ORAL NIGHTLY
Status: DISCONTINUED | OUTPATIENT
Start: 2024-01-29 | End: 2024-02-01 | Stop reason: HOSPADM

## 2024-01-29 RX ORDER — M-VIT,TX,IRON,MINS/CALC/FOLIC 27MG-0.4MG
1 TABLET ORAL DAILY
COMMUNITY

## 2024-01-29 RX ORDER — HYDROXYZINE HYDROCHLORIDE 25 MG/1
25 TABLET, FILM COATED ORAL 3 TIMES DAILY PRN
Status: DISCONTINUED | OUTPATIENT
Start: 2024-01-29 | End: 2024-02-01 | Stop reason: HOSPADM

## 2024-01-29 RX ORDER — OLANZAPINE 10 MG/1
10 TABLET ORAL ONCE
Status: COMPLETED | OUTPATIENT
Start: 2024-01-29 | End: 2024-01-29

## 2024-01-29 RX ORDER — TRAZODONE HYDROCHLORIDE 50 MG/1
50 TABLET ORAL NIGHTLY
Status: DISCONTINUED | OUTPATIENT
Start: 2024-01-29 | End: 2024-01-30

## 2024-01-29 RX ADMIN — OLANZAPINE 10 MG: 10 TABLET, FILM COATED ORAL at 21:43

## 2024-01-29 RX ADMIN — TRAZODONE HYDROCHLORIDE 50 MG: 50 TABLET ORAL at 21:43

## 2024-01-29 RX ADMIN — Medication 5 MG: at 21:43

## 2024-01-29 SDOH — HEALTH STABILITY: PHYSICAL HEALTH: ON AVERAGE, HOW MANY DAYS PER WEEK DO YOU ENGAGE IN MODERATE TO STRENUOUS EXERCISE (LIKE A BRISK WALK)?: 0 DAYS

## 2024-01-29 SDOH — HEALTH STABILITY: PHYSICAL HEALTH: ON AVERAGE, HOW MANY MINUTES DO YOU ENGAGE IN EXERCISE AT THIS LEVEL?: 0 MIN

## 2024-01-29 ASSESSMENT — LIFESTYLE VARIABLES
HOW MANY STANDARD DRINKS CONTAINING ALCOHOL DO YOU HAVE ON A TYPICAL DAY: 1 OR 2
HOW OFTEN DO YOU HAVE A DRINK CONTAINING ALCOHOL: 2-3 TIMES A WEEK

## 2024-01-29 ASSESSMENT — PATIENT HEALTH QUESTIONNAIRE - PHQ9
SUM OF ALL RESPONSES TO PHQ QUESTIONS 1-9: 20
5. POOR APPETITE OR OVEREATING: 2
9. THOUGHTS THAT YOU WOULD BE BETTER OFF DEAD, OR OF HURTING YOURSELF: 3
6. FEELING BAD ABOUT YOURSELF - OR THAT YOU ARE A FAILURE OR HAVE LET YOURSELF OR YOUR FAMILY DOWN: 3
4. FEELING TIRED OR HAVING LITTLE ENERGY: 2
SUM OF ALL RESPONSES TO PHQ QUESTIONS 1-9: 20
1. LITTLE INTEREST OR PLEASURE IN DOING THINGS: 3
SUM OF ALL RESPONSES TO PHQ QUESTIONS 1-9: 20
8. MOVING OR SPEAKING SO SLOWLY THAT OTHER PEOPLE COULD HAVE NOTICED. OR THE OPPOSITE, BEING SO FIGETY OR RESTLESS THAT YOU HAVE BEEN MOVING AROUND A LOT MORE THAN USUAL: 1
10. IF YOU CHECKED OFF ANY PROBLEMS, HOW DIFFICULT HAVE THESE PROBLEMS MADE IT FOR YOU TO DO YOUR WORK, TAKE CARE OF THINGS AT HOME, OR GET ALONG WITH OTHER PEOPLE: 3
10. IF YOU CHECKED OFF ANY PROBLEMS, HOW DIFFICULT HAVE THESE PROBLEMS MADE IT FOR YOU TO DO YOUR WORK, TAKE CARE OF THINGS AT HOME, OR GET ALONG WITH OTHER PEOPLE: SOMEWHAT DIFFICULT
2. FEELING DOWN, DEPRESSED OR HOPELESS: 3
3. TROUBLE FALLING OR STAYING ASLEEP: 2
7. TROUBLE CONCENTRATING ON THINGS, SUCH AS READING THE NEWSPAPER OR WATCHING TELEVISION: 1
2. FEELING DOWN, DEPRESSED OR HOPELESS: SEVERAL DAYS
SUM OF ALL RESPONSES TO PHQ9 QUESTIONS 1 & 2: 2
SUM OF ALL RESPONSES TO PHQ9 QUESTIONS 1 & 2: 6
SUM OF ALL RESPONSES TO PHQ QUESTIONS 1-9: 17
1. LITTLE INTEREST OR PLEASURE IN DOING THINGS: SEVERAL DAYS

## 2024-01-29 ASSESSMENT — SOCIAL DETERMINANTS OF HEALTH (SDOH)
HOW OFTEN DO YOU GET TOGETHER WITH FRIENDS OR RELATIVES?: THREE TIMES A WEEK
WITHIN THE LAST YEAR, HAVE YOU BEEN AFRAID OF YOUR PARTNER OR EX-PARTNER?: PATIENT DECLINED
IN A TYPICAL WEEK, HOW MANY TIMES DO YOU TALK ON THE PHONE WITH FAMILY, FRIENDS, OR NEIGHBORS?: THREE TIMES A WEEK
ARE YOU MARRIED, WIDOWED, DIVORCED, SEPARATED, NEVER MARRIED, OR LIVING WITH A PARTNER?: NEVER MARRIED
HOW OFTEN DO YOU ATTEND CHURCH OR RELIGIOUS SERVICES?: NEVER
WITHIN THE LAST YEAR, HAVE TO BEEN RAPED OR FORCED TO HAVE ANY KIND OF SEXUAL ACTIVITY BY YOUR PARTNER OR EX-PARTNER?: PATIENT DECLINED
WITHIN THE LAST YEAR, HAVE YOU BEEN HUMILIATED OR EMOTIONALLY ABUSED IN OTHER WAYS BY YOUR PARTNER OR EX-PARTNER?: PATIENT DECLINED
HOW HARD IS IT FOR YOU TO PAY FOR THE VERY BASICS LIKE FOOD, HOUSING, MEDICAL CARE, AND HEATING?: SOMEWHAT HARD
WITHIN THE LAST YEAR, HAVE YOU BEEN KICKED, HIT, SLAPPED, OR OTHERWISE PHYSICALLY HURT BY YOUR PARTNER OR EX-PARTNER?: PATIENT DECLINED
HOW OFTEN DO YOU ATTENT MEETINGS OF THE CLUB OR ORGANIZATION YOU BELONG TO?: NEVER
DO YOU BELONG TO ANY CLUBS OR ORGANIZATIONS SUCH AS CHURCH GROUPS UNIONS, FRATERNAL OR ATHLETIC GROUPS, OR SCHOOL GROUPS?: NO

## 2024-01-29 ASSESSMENT — SLEEP AND FATIGUE QUESTIONNAIRES
SLEEP PATTERN: RESTLESSNESS
DO YOU HAVE DIFFICULTY SLEEPING: YES

## 2024-01-29 NOTE — ED PROVIDER NOTES
[facility] EMERGENCY DEPT  eMERGENCY dEPARTMENT eNCOUnter      Pt Name: Willem Ndiaye  MRN: [id number]  Birthdate 2000  Date of evaluation: 1/29/2024  Provider: Maddi Pham DO    CHIEF COMPLAINT       Chief Complaint   Patient presents with    Mental Health Problem     SI and paranoid delusions for few weeks         HISTORY OF PRESENT ILLNESS   (Location/Symptom, Timing/Onset,Context/Setting, Quality, Duration, Modifying Factors, Severity)  Note limiting factors.   Willem Ndiaye is a 23 y.o. female who presents to the emergency department      The patient is a 23-year-old female who presents the emergency department with her family with a complaint of hallucinations.  Patient says over the last couple weeks she has been having auditory and visual hallucinations.  She says she has felt paranoid.  She has since started to feel suicidal because the symptoms are starting to wear on her.  She says she saw her therapist today and her family found out that she was having suicidal ideations prompting her to come to the emergency department.  Patient denies any specific plan.  She does have a prior history of self cutting behavior but denies any new wounds.  She denies taking too much medication.  She occasionally uses marijuana from the dispensary.  She has been taking her prescribed medications as prescribed.  There are no other complaints or concerns at this time.          NursingNotes were reviewed.    REVIEW OF SYSTEMS    (2-9 systems for level 4, 10 or more for level 5)     As per HPI         PAST MEDICALHISTORY     Past Medical History:   Diagnosis Date    ADHD (attention deficit hyperactivity disorder)     Anxiety     Depression     Personality disorder, depressive     Self mutilating behavior          SURGICAL HISTORY       Past Surgical History:   Procedure Laterality Date    MASTECTOMY, BILATERAL Bilateral 08/02/2022         CURRENT MEDICATIONS     Previous Medications    AMPHETAMINE-DEXTROAMPHETAMINE (ADDERALL XR)  UA NEG (*)     WBC, UA 8 (*)     All other components within normal limits   COVID-19, RAPID   ETHANOL   HCG, SERUM, QUALITATIVE       All other labs were within normal range or not returned as of this dictation.    Prior records reviewed: No recent ED visits for similar complaints    EMERGENCY DEPARTMENT COURSE and DIFFERENTIAL DIAGNOSIS/MDM:   Vitals:    Vitals:    01/29/24 1407   BP: 114/70   Pulse: 90   Resp: 16   Temp: 98.3 °F (36.8 °C)   SpO2: 100%   Weight: 63.5 kg (140 lb)   Height: 1.651 m (5' 5\")       MDM    The patient is a 23-year-old female who presents the emergency department with her family with a complaint of hallucinations.  Patient says over the last couple weeks she has been having auditory and visual hallucinations.  She says she has felt paranoid.  She has since started to feel suicidal because the symptoms are starting to wear on her.  She says she saw her therapist today and her family found out that she was having suicidal ideations prompting her to come to the emergency department.  Patient denies any specific plan.  She does have a prior history of self cutting behavior but denies any new wounds.  She denies taking too much medication.  She occasionally uses marijuana from the dispensary.  She has been taking her prescribed medications as prescribed.  There are no other complaints or concerns at this time.      Prior records reviewed by me as noted above. I independently reviewed the results of the ED workup.  I have reviewed the workup.  Urine drug screen positive for amphetamines and cannabinoids otherwise largely unremarkable for any significant acute abnormality warranting emergent intervention.  Patient medically cleared for psych evaluation.    1745 care signed out to Dr. Estrella awaiting psych consultation            :  Unless otherwise noted below, none     Procedures    FINAL IMPRESSION      1. Hallucinations          DISPOSITION/PLAN   DISPOSITION     pending    PATIENT REFERRED

## 2024-01-29 NOTE — PROGRESS NOTES
[facility] ADULT INITIAL INTAKE ASSESSMENT     1/29/24    Willem Ndiaye ,a 23 y.o. female, presents to the ED for a psychiatric assessment.     ED Arrival time: 1403  ED physician:  Ankit VERA Notification time: In ER   [facility] Assessment start time: 1735  Psychiatrist call time:   Spoke with Dr. Vasquez    Patient is referred by: Family     Reason for visit to ED - Presenting problem:     PT states reason for ED visit, \"My mom brought me here because my therapist told her to\"    Patient is dressed in paper scrubs with 1:1 sitter. At bedside is mom, dad and sister. Patient request for family to leave during assessment. Patient reports that his therapist recommend him to come to the ER. Patient states \"I have been doing pretty bad. I was talking to him about stuff that was going on. About my paranoia and delusions. It made me suicidal. He wanted me to try something for medication.\" Patient is currently taking adderall for ADD. Patient reports being dx with anxiety and depression. Patient reports that he is hearing voices. States \"they are talking about hurting me. I got really scared that someone poisoned our water at home. They have video cameras. I see faces. It is getting worse and worse. I get so scared and it is just a lot.\" Reports that he does not know who the voices are but there are multiple voices. Patient is looking around the room while describing AVH. States that he has been having AVH for a few weeks.  Patient denies synthetic drug use. Patient reports going to the dispensary in [place] for THC and drinks tequila shots/vodka cranberry twice a week. Patient reports hx of admissions at [facility]. Reports being inpatient 4 times and the last admission at age 14 per patient. Patient is paranoid and reports having suicidal thoughts with no plan. Denies HI.     Physician reports \"The patient is a 23-year-old female who presents the emergency department with her family with a complaint of  stress of being here and having to take out my piercing.\"   Depression 1-10:  8  Explain if increased: \"Its ok.\"  Level of function outside hospital decreased: yes   If yes explain: \"I just do what I have to do.\"   Has patient been completing ADL's: yes    Mental Status Evaluation:     Appearance:  younger than stated age   Behavior:  Restless & fidgety   Speech:  normal pitch and normal volume   Mood:  anxious and depressed   Affect:  mood-congruent   Thought Process:  circumstantial   Thought Content:  suicidal   Sensorium:  person, place, situation, day of week, month of year, year, and date   Cognition:  grossly intact   Insight:  fair         Psychiatric Review Of Systems:     Recent Sleep changes: yes-decreased   Recent appetite changes: yes- decreased   Recent weight changes/Pounds gained (+) or lost (-): no     Current living arrangement:Mom   Current Support System:Mom, Dad and Sister   Employment: [employer]     Psychiatric Hospitalizations: Yes   [facility] & [facility]   Outpatient Psychiatric Treatment:[facility]    Family History:    Family history of mental illness: yes- Mom: depression and anxiety, Sister:depression, anxiety, ADHD and Paranoia, Father- unknown   Family members with suicide attempt: no   If yes explain (attempted or completed):    Substance Abuse History:     SBIRT Completed: yes  Brief Intervention completed if needed:  (Yes/No)    Current ETOH LEVELS: <10    ETOH Usage:     Amount drinking daily: occasional, social use    Date of last drink: Reports that he drinks with friends and father a couple times a week   Longest period of sobriety:    Substance/Chemical Abuse/Recreational Drug History:  Substance used: marijuana  Date of last substance use: 01/29/2024- reports daily use   Tobacco Use: no   History of rehab treatment:denies   How many times in rehab:  Last time in rehab:  Family history of substance

## 2024-01-30 PROBLEM — F12.90 CANNABIS USE DISORDER: Status: ACTIVE | Noted: 2024-01-30

## 2024-01-30 PROBLEM — G47.00 INSOMNIA: Status: ACTIVE | Noted: 2024-01-30

## 2024-01-30 PROCEDURE — 1240000000 HC EMOTIONAL WELLNESS R&B

## 2024-01-30 PROCEDURE — 90792 PSYCH DIAG EVAL W/MED SRVCS: CPT | Performed by: NURSE PRACTITIONER

## 2024-01-30 PROCEDURE — 6370000000 HC RX 637 (ALT 250 FOR IP): Performed by: NURSE PRACTITIONER

## 2024-01-30 PROCEDURE — 6370000000 HC RX 637 (ALT 250 FOR IP): Performed by: PSYCHIATRY & NEUROLOGY

## 2024-01-30 RX ORDER — LAMOTRIGINE 25 MG/1
25 TABLET ORAL DAILY
Status: DISCONTINUED | OUTPATIENT
Start: 2024-01-30 | End: 2024-02-01 | Stop reason: HOSPADM

## 2024-01-30 RX ORDER — TRAZODONE HYDROCHLORIDE 50 MG/1
50 TABLET ORAL NIGHTLY PRN
Status: DISCONTINUED | OUTPATIENT
Start: 2024-01-30 | End: 2024-02-01 | Stop reason: HOSPADM

## 2024-01-30 RX ORDER — OLANZAPINE 5 MG/1
7.5 TABLET ORAL NIGHTLY
Status: DISCONTINUED | OUTPATIENT
Start: 2024-01-30 | End: 2024-02-01 | Stop reason: HOSPADM

## 2024-01-30 RX ADMIN — TRAZODONE HYDROCHLORIDE 50 MG: 50 TABLET ORAL at 21:13

## 2024-01-30 RX ADMIN — Medication 5 MG: at 21:14

## 2024-01-30 RX ADMIN — HYDROXYZINE HYDROCHLORIDE 25 MG: 25 TABLET, FILM COATED ORAL at 21:13

## 2024-01-30 RX ADMIN — LAMOTRIGINE 25 MG: 25 TABLET ORAL at 14:27

## 2024-01-30 RX ADMIN — OLANZAPINE 7.5 MG: 5 TABLET, FILM COATED ORAL at 21:14

## 2024-01-30 NOTE — DISCHARGE INSTR - LAB
Use Disorders; MRT   Patients under 18 years old accepted   Accepts Medicaid; Medicare; Private Insurance; Sliding Scale  [facility]   [address] [facility], [address]   [email or web address]   [phone]   8:00 am- 4:30 pm [facility], [facility], Motivational Interviewing, Family Functional Therapy, Trauma Focused Cognitive Behavior Therapy, Parent Child Interaction Therapy  Patients under 18 years old accepted  DCBS/Cabinet Referrals only     [address], [place], [place] [address]  Phone: [phone]  [email or web address]    [phone]   General Behavioral Health:  Medication Assisted Treatment for Opioid Use Disorders; MRT   Patients under 18 years old accepted   Accepts Medicaid; Medicare; Private Insurance; Sliding Scale    [facility]   [address]  [address]   [email or web address] [phone]   Outpatient Behavioral Health   Accepts Medicare Part B Primary/Most Secondary  [facility] in [place], [place],  and [facility] - Find a Provider ([email or web address])   [phone]   Patients under 18 years old accepted   Accepts a majority of Commercial, Medicare, and Medicaid    [facility], [facility]   [address] [facility] [id number] [phone]   Substance Abuse Counseling   Private Pay  [facility]   [address] [facility] [address]   [email or web address]   [phone]   Crisis Trauma, EMDR, Addiction  Patients under 18 years old accepted   Services offered at reduced cost  Elsie Mukherjee [facility]   [address] [facility], [address]   [email or web address]   [phone] Trauma Treatment Specialist, with a focus on Holistic Psychotherapy
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
## 2024-01-30 NOTE — PROGRESS NOTES
[facility] Daily Assessment  Nursing Progress Note    Room: [facility]/611-01   Name: Willem Ndiaye   Age: 23 y.o.   Gender: female   Dx: Psychosis, unspecified psychosis type (HCC)  Precautions: close watch and suicide risk  Inpatient Status: voluntary     SLEEP:  Sleep Quality Good  Sleep Medications: Yes   PRN Sleep Meds: Yes     MEDICAL:  Other PRN Meds: No   Med Compliant: Yes  Accu-Chek: No  Oxygen/CPAP/BiPAP: No  CIWA/CINA: No   PAIN Assessment: none  Side Effects from medication: No    Metabolic Screening:  No results found for: \"LABA1C\"  Lab Results   Component Value Date    CHOL 122 10/02/2014     Lab Results   Component Value Date    TRIG 113 10/02/2014     Lab Results   Component Value Date    HDL 41 10/02/2014     No components found for: \"LDLCAL\"  No components found for: \"LABVLDL\"  Body mass index is 21.9 kg/m².  BP Readings from Last 2 Encounters:   01/30/24 110/73   07/31/23 106/84       Medical Bed:   Is patient in a medical bed? no   If medical bed is in use, has nursing secured room while patient is awake and out of the room? NA  Has safety checks by nursing been completed on the bed/room this shift? yes    Protective Factors:  Patient identifies protective factors with nursing staff as follows:   Identifies reasons for living: Yes   Supportive Social Network or family: Yes    Belief that suicide is immoral/high spirituality: No   Responsibility to family or others/living with family: Yes   Fear of death or dying due to pain and suffering: No   Engaged in work or school: Yes  If Patient is unable to identify, reason why? NA    Depression: 8   Anxiety: 8   SI passive intermittent, contracts for safety.     HI Negative for homicidal ideation        AVH:yes, hears voices at times If Hallucinations are present, describe? Hears voices     Appetite: good   Percent Meals: 75%   Social: Yes   Speech: normal   Appearance: appropriately dressed and healthy looking    GROUP:  Group Participation:  Yes  Participation Quality: Active Listener    Notes:   ALOx4, med compliant, social with peers and attends group.  Appetite good, slept well.  Reports depression and anxiety 8/10,  Hears voices, but improving.  Denies SI, HI, AVH.  Will continue to monitor.      Electronically signed by Dillan Barrera RN on 1/31/24 at 2:47 PM CST

## 2024-01-30 NOTE — PLAN OF CARE
Group Therapy Note     Date: 1/30/2024  Start Time: 1000  End Time:  1045  Number of Participants: 10     Type of Group: Psychotherapy     Patient's Goal: Patient will process emotions and actions and how to relate to other or their with others. Patient discussed open communication and feelings and emotions.     Notes:  Patient attended process group as scheduled, patient identified a issue to work on today regarding how they will interact and relate to others.    Status After Intervention:  Improved     Participation Level: Active Listener     Participation Quality: Appropriate, Attentive, and Sharing        Speech:  normal        Thought Process/Content: Logical        Affective Functioning: Congruent        Mood: euthymic        Level of consciousness:  Alert        Response to Learning: Able to verbalize current knowledge/experience        Endings: None Reported     Modes of Intervention: Education, Support, and Socialization        Discipline Responsible: /Counselor        Signature:  Giuliana Yeung LPC

## 2024-01-30 NOTE — H&P
[facility]    Psychiatric Initial Evaluation    Date of Evaluation:  1/30/2024  Session Type:  56133 Psychiatric Diagnostic Interview Exam   Name:  Willem Ndiaye  Age:  23 y.o.  Sex:  female  Ethnicity:   Primary Care Physician:  Miracle Escobar MD   Patient Care Team:  Patient Care Team:  Miracle Escobar MD as PCP - General (Family Medicine)  [facility]  Chief Complaint: \" I was having paranoid delusions.\"    History of Present Illness    Historian: patient  Complaint Type: anxiety, depression, and sleep disturbance  Course of Symptoms: ongoing  Symptoms Onset: gradual  Onset Approximately: gradual  Precipitating Factors: prescribed adderall and using thc  Severity: moderate  Risk Factors:  history of depression        Patient is a 24 y/o   transgender female to male who presented with psychosis and suicidal ideation. UDS positive for amphetamine and cannabis. BAL negative. He endorses prior inpatient psychiatric hospitalizations and no prior suicide attempts.      Today he reports that for the past 2 weeks he has been having auditory hallucinations of hearing whispers like they are\" fucking with me.\" He reports at times he hears them saying to hurt himself. He also endorses visual hallucinations as seeing  faces peeking around the corner. He is not responding to internal stimuli at this time. His thought process is linear, logical and oriented. He reports that he became tired of hearing the voices and started having suicidal thoughts with no specific plan. He went to his counselor yesterday and told him how he had been feeling who recommended he come to the ER.    Today he admits to \"heavy marijuana use\" for the past 4 years. He started  using cannabis at age 14 and used until age 17. Reports he then stopped from ages [age] and started using again. He reports smoking \"5 blunts per day\" as well as using
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
## 2024-01-30 NOTE — PROGRESS NOTES
Comprehensive Nutrition Assessment    Type and Reason for Visit:  Initial, Consult    Nutrition Recommendations/Plan:   Continue POC     Nutrition Assessment:    Pt tolerates a Regular diet. PO intake of meals is good. BMP is WNL. Consult received for supplements. All supplements contain milk, and pt has an allergy to milk. Continue Regular diet as ordered.    Current Nutrition Intake & Therapies:    Average Meal Intake: %  ADULT DIET; Regular; Safety Tray; Safety Tray (Disposables)    Anthropometric Measures:  Height: 165.1 cm (5' 5\")  Ideal Body Weight (IBW): 125 lbs (57 kg)    Current Body Weight: 59.7 kg (131 lb 9.8 oz)  Current BMI (kg/m2): 21.9  BMI Categories: Normal Weight (BMI 18.5-24.9)    Nutrition Diagnosis:   No nutrition diagnosis at this time     Nutrition Interventions:   Food and/or Nutrient Delivery: Continue Current Diet  Coordination of Nutrition Care: Continue to monitor while inpatient    Goals:  Goals: PO intake 75% or greater, Meet at least 75% of estimated needs    Nutrition Monitoring and Evaluation:   Food/Nutrient Intake Outcomes: Food and Nutrient Intake  Physical Signs/Symptoms Outcomes: Biochemical Data, Weight    Cat Blanco, MS, RD, LD  Contact: [phone]

## 2024-01-30 NOTE — DISCHARGE INSTRUCTIONS
Medications:   Medication Summary Provided.   I understand that I should take only the medications on my list.   --why and when I need to take each medication.   --which side effects to watch for.   --that I should carry my medication information at all times in case of emergency    Situations.   --I will take all medications until discontinued by physician.  I will take all my medications to follow up appointments.   --check with my physician or pharmacist before taking any new medication, over    the counter product or drink alcohol.   --ask about food, drug and dietary supplement interactions.   --discard old lists and update records with medication providers.    Behavior Health Follow Up:  Original Referral Source: ED  Discharge Diagnosis: [unfilled]  Recomendations for Level of Care: Follow Up  Patient Status at Discharge: Stable  [facility]  was:   Aftercare plan faxed:    Faxed by: Social Work staff Giuliana   Date: 24   Time: Faxed by staff  Prescriptions sent with pt.    General Information:   Questions regarding your bill: Call [phone]   Suicide Hotline (Rescue Crisis) 1-610.101.9526   To obtain results of pending studies call Medical Records at: [phone]   For emergencies and 24 hour/7 days a week contact information: [phone]   Financial Resources*    Crisis Resources    [facility] Family Support Office  [facility] [address] [place], [place] [address]  [phone]  [facility]   [address] [place] [address]  Website: [email or web address].wkas-[email or web address]/  [phone]  [employer]   [address] [place], [place] [address]  Website: https://www.abeo/  [phone]  [employer] [facility]  [address] [place], [place] [address]  Website: [email or web address]  [phone]  [facility]  [address] [place], [place] [address]  [phone]  [address]  local Senior Center.    [facility]  Typically serve a daily lunch during the week- contact for meal times  [facility]  [address] [place] [address]  [phone]  Serving a free lunch from 11:00 AM to 1:00 PM Monday through Friday. Facility includes Showers, Laundry Facility, and .  [facility]  [address] [place], [place] [address]  [phone]  [facility]  [address] [place], [place] [address]  [phone]  Serving a free lunch on Sundays  [facility]  [address] [place] [address]  [phone]  [facility]  [facility] [address]  [phone]  Serving a free lunch on Saturdays from 11am-1pm    [facility]   Typically serve a daily meal and serve as point of contact for Meals on Wheels program  [facility]  [address] [place], [place]  [phone]    Food Pantry  Food distribution. Most require person to bring ID/documentation. Contact for information.    [employer]   [address] [place], [place] [address]  [phone]  [address] [address]  [phone]  [facility]   [address] [place], [place] [address]  [phone]  [facility]  [address] [facility] [address] [place] [address]  [phone]  [employer]  [facility] [place] [address]  [email or web address]://[email or web address]   [phone]    [facility] [employer]  Community supported miniature pantry filled with non-perishables. Take what you need.   [facility]  [address] [place], [place] [address] (by the bus stop)  [facility]  [facility] [place], [place] [address] (far left side of front parking  lot)  [facility]   [address] [place], [place] [address] (right side of front

## 2024-01-30 NOTE — PROGRESS NOTES
SW met with patient to complete psychosocial and lifetime CSSR-S on this date. Patients long and short-term goals discussed. Patient voiced understanding. Treatment plan sheet signed. Patient verbalized understanding of the treatment plan. Patient participated in goals and objectives of the treatment plan. Patient discussed safety plan with . SW explained treatment goals with pt. Decreasing depression and anxiety by improvement of positive coping patterns was discussed. Pt acknowledged understanding of treatment goals and signed treatment plan signature sheet.       In the last 6 months has the patient been a danger to self: Yes  In the last 6 months has the patient been a danger to others: No  Legal Guardian/POA: No    Activity Assessment:  Skills:  Talents:  Interests:    Provided patient with [employer] handout entitled \"Quitting Smoking.\"  Reviewed handout with patient: addressing dangers of smoking, developing coping skills, and providing basic information about quitting.       Patient received all components practical counseling of tobacco practical counseling during the hospital stay.
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
## 2024-01-30 NOTE — ED NOTES
ED TO INPATIENT SBAR HANDOFF    Patient Name: Willem Ndiaye   : 2000  23 y.o.   Family/Caregiver Present: Yes  Code Status Order: No Order    C-SSRS: Risk of Suicide: High Risk  Sitter Yes  Restraints:         Situation  Chief Complaint   Patient presents with    Mental Health Problem     SI and paranoid delusions for few weeks     Brief Description of Patient's Condition: Patient is very calm and reserved.  Mental Status: oriented, alert, coherent, logical, thought processes intact, and able to concentrate and follow conversation  Arrived from: home    Imaging:   No orders to display     COVID-19 Results:   Internal Administration   First Dose COVID-19, MODERNA BLUE border, Primary or Immunocompromised, (age 12y+), IM, 100 mcg/0.5mL  2021   Second Dose COVID-19, MODERNA BLUE border, Primary or Immunocompromised, (age 12y+), IM, 100 mcg/0.5mL   04/15/2021       Last COVID Lab SARS-CoV-2, NAAT (no units)   Date Value   2024 Not Detected           Abnormal labs:   Abnormal Labs Reviewed   CBC WITH AUTO DIFFERENTIAL - Abnormal; Notable for the following components:       Result Value    MCHC 32.9 (*)     RDW 15.5 (*)     All other components within normal limits   COMPREHENSIVE METABOLIC PANEL - Abnormal; Notable for the following components:    Total Bilirubin 1.5 (*)     All other components within normal limits   DRUG SCRN, BUPRENORPHINE - Abnormal; Notable for the following components:    Amphetamine Screen, Urine POSITIVE (*)     Cannabinoid Scrn, Ur POSITIVE (*)     All other components within normal limits   URINALYSIS WITH REFLEX TO CULTURE - Abnormal; Notable for the following components:    Leukocyte Esterase, Urine MODERATE (*)     All other components within normal limits   ACETAMINOPHEN LEVEL - Abnormal; Notable for the following components:    Acetaminophen Level <5 (*)     All other components within normal limits   SALICYLATE LEVEL - Abnormal; Notable for the following components:     Salicylate, Serum <0.3 (*)     All other components within normal limits   MICROSCOPIC URINALYSIS - Abnormal; Notable for the following components:    Bacteria, UA NEGATIVE (*)     Crystals, UA NEG (*)     WBC, UA 8 (*)     All other components within normal limits     Background  Allergies:   Allergies   Allergen Reactions    Prozac [Fluoxetine] Other (See Comments)     hallucinations    Milk-Related Compounds     Pristiq [Desvenlafaxine] Other (See Comments)     Mood was worse    Wheat Dextrin     Zoloft [Sertraline Hcl] Other (See Comments)     Mother states that it made her daughter paranoid.        Current Medications:     History:   Past Medical History:   Diagnosis Date    ADHD (attention deficit hyperactivity disorder)     Anxiety     Depression     Personality disorder, depressive     Self mutilating behavior        Assessment  Vitals: Level of Consciousness: Alert (0)   Vitals:    01/29/24 1407   BP: 114/70   Pulse: 90   Resp: 16   Temp: 98.3 °F (36.8 °C)   SpO2: 100%   Weight: 63.5 kg (140 lb)   Height: 1.651 m (5' 5\")     Predictive Model Details   No score data available for Deterioration Index      NPO? No  O2 Flow Rate:      Cardiac Rhythm: NSR   NIH Score: NIH     Active LDA's:    Pertinent or High Risk Medications/Drips: no   If Yes, please provide details:    Blood Product Administration: no  If Yes, please provide details:    Sepsis Risk Score Sepsis Risk Score: 0.38    Admitted with Sepsis? No    Recommendation  Incomplete orders:    Patient Belongings: With security  Additional Comments:    If any further questions, please call Sending RN at [phone]    Electronically signed by: Electronically signed by Donna Kan RN (Dottie) on 1/29/2024 at 7:12 PM
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
## 2024-01-30 NOTE — H&P
Behavioral Services  Medicare Certification Upon Admission    I certify that this patient's inpatient psychiatric hospital admission is medically necessary for:    [x] (1) Treatment which could reasonably be expected to improve this patient's condition,       [x] (2) Or for diagnostic study;     AND     [x](2) The inpatient psychiatric services are provided while the individual is under the care of a physician and are included in the individualized plan of care.    Estimated length of stay/service :3 days-5 weeks    Plan for post-hospital care :tbd    Electronically signed by JUVENAL aGo on 1/30/2024 at 10:44 AM

## 2024-01-30 NOTE — PROGRESS NOTES
Group Note    Date: 01/30/24  Start Time: 8:00 AM   End Time:8:30 AM     Number of Participants: 12    Type of Group: Community/Goal     Patient's Goal:  \"Talking/ introduce myself being open about hallucinations\"    Notes:      Status After Intervention:      Participation Level: Active Listener    Participation Quality: Appropriate    Speech:  normal    Thought Process/Content: Logical    Mood:  Calm    Level of consciousness:  Alert    Response to Learning: Able to verbalize current knowledge/experience    Modes of Intervention: Education and Support    Discipline Responsible: Behavioral Health Technician     Signature:  Sadiq Coronado

## 2024-01-30 NOTE — PLAN OF CARE
Group Therapy Note     Date: 1/30/2024  Start Time: 1100  End Time:  1130  Number of Participants: 10     Type of Group: Psychoeducation     Wellness Binder Information  Module Name:  staying well  Session Number:  1     Patient's Goal:  daily maintenance and coping skills     Notes:  pt was verbally prompted to attend group. Pt refused. Information about stay well was provided.     Status After Intervention:       Participation Level:      Participation Quality:         Speech:          Thought Process/Content:         Affective Functioning:         Mood:         Level of consciousness:          Response to Learning:         Endings:      Modes of Intervention:         Discipline Responsible: Psychoeducational Specialist        Signature:  Lauren Cam

## 2024-01-31 LAB
25(OH)D3 SERPL-MCNC: 20 NG/ML
CHOLEST SERPL-MCNC: 115 MG/DL (ref 160–199)
HBA1C MFR BLD: 5.4 % (ref 4–6)
HDLC SERPL-MCNC: 42 MG/DL (ref 65–121)
LDLC SERPL CALC-MCNC: 59 MG/DL
TRIGL SERPL-MCNC: 69 MG/DL (ref 0–149)
TSH SERPL DL<=0.005 MIU/L-ACNC: 1.05 UIU/ML (ref 0.35–5.5)
VIT B12 SERPL-MCNC: 373 PG/ML (ref 211–946)

## 2024-01-31 PROCEDURE — 83036 HEMOGLOBIN GLYCOSYLATED A1C: CPT

## 2024-01-31 PROCEDURE — 6370000000 HC RX 637 (ALT 250 FOR IP): Performed by: FAMILY MEDICINE

## 2024-01-31 PROCEDURE — 36415 COLL VENOUS BLD VENIPUNCTURE: CPT

## 2024-01-31 PROCEDURE — 6370000000 HC RX 637 (ALT 250 FOR IP): Performed by: NURSE PRACTITIONER

## 2024-01-31 PROCEDURE — 80061 LIPID PANEL: CPT

## 2024-01-31 PROCEDURE — 1240000000 HC EMOTIONAL WELLNESS R&B

## 2024-01-31 PROCEDURE — 6370000000 HC RX 637 (ALT 250 FOR IP): Performed by: PSYCHIATRY & NEUROLOGY

## 2024-01-31 PROCEDURE — 99232 SBSQ HOSP IP/OBS MODERATE 35: CPT | Performed by: NURSE PRACTITIONER

## 2024-01-31 PROCEDURE — 82607 VITAMIN B-12: CPT

## 2024-01-31 PROCEDURE — 82306 VITAMIN D 25 HYDROXY: CPT

## 2024-01-31 PROCEDURE — 84443 ASSAY THYROID STIM HORMONE: CPT

## 2024-01-31 RX ORDER — IBUPROFEN 400 MG/1
400 TABLET ORAL EVERY 6 HOURS PRN
Status: DISCONTINUED | OUTPATIENT
Start: 2024-01-31 | End: 2024-02-01 | Stop reason: HOSPADM

## 2024-01-31 RX ORDER — CHOLECALCIFEROL (VITAMIN D3) 125 MCG
500 CAPSULE ORAL DAILY
Status: DISCONTINUED | OUTPATIENT
Start: 2024-01-31 | End: 2024-02-01 | Stop reason: HOSPADM

## 2024-01-31 RX ORDER — ERGOCALCIFEROL 1.25 MG/1
50000 CAPSULE ORAL WEEKLY
Status: DISCONTINUED | OUTPATIENT
Start: 2024-01-31 | End: 2024-02-01 | Stop reason: HOSPADM

## 2024-01-31 RX ADMIN — LAMOTRIGINE 25 MG: 25 TABLET ORAL at 08:37

## 2024-01-31 RX ADMIN — OLANZAPINE 7.5 MG: 5 TABLET, FILM COATED ORAL at 21:32

## 2024-01-31 RX ADMIN — CYANOCOBALAMIN TAB 500 MCG 500 MCG: 500 TAB at 11:39

## 2024-01-31 RX ADMIN — ERGOCALCIFEROL 50000 UNITS: 1.25 CAPSULE ORAL at 11:39

## 2024-01-31 RX ADMIN — IBUPROFEN 400 MG: 400 TABLET, FILM COATED ORAL at 12:39

## 2024-01-31 RX ADMIN — Medication 5 MG: at 21:32

## 2024-01-31 RX ADMIN — HYDROXYZINE HYDROCHLORIDE 25 MG: 25 TABLET, FILM COATED ORAL at 18:27

## 2024-01-31 ASSESSMENT — PAIN DESCRIPTION - LOCATION: LOCATION: BACK

## 2024-01-31 ASSESSMENT — PAIN - FUNCTIONAL ASSESSMENT: PAIN_FUNCTIONAL_ASSESSMENT: ACTIVITIES ARE NOT PREVENTED

## 2024-01-31 ASSESSMENT — PAIN DESCRIPTION - DESCRIPTORS: DESCRIPTORS: ACHING

## 2024-01-31 ASSESSMENT — PAIN SCALES - GENERAL: PAINLEVEL_OUTOF10: 5

## 2024-01-31 ASSESSMENT — PAIN DESCRIPTION - ORIENTATION: ORIENTATION: RIGHT;LEFT;LOWER

## 2024-01-31 NOTE — PROGRESS NOTES
[facility]      Psychiatric Progress Note    Name:  Willem Ndiaye  Date:  1/31/2024  Age:  23 y.o.  Sex:  female  Ethnicity:   Primary Care Physician:  Miracle Escobar MD   Patient Care Team:  Patient Care Team:  Miracle Escobar MD as PCP - General (Family Medicine)  Miracle Escobar MD as PCP - Empaneled Provider  Chief Complaint: \" I feel like I am getting better.\"        Historian:patient  Complaint Type: anxiety, depression, illegal drug usage, loss of interest in favorite activities, and sleep disturbance  Course of Symptoms: improved  Precipitating Factors: severe thc use     Subjective  Nursing notes were reviewed and patient had no behavioral issues during the night. As needed medications administered during the night include Trazodone and Hydroxyzine. Today he denies suicidal ideation and homicidal ideation. He reports that he heard \"a little whisper\" this morning. He feels that auditory hallucinations are improving. He had a visit from his family last night which went well. Reports his family is supportive. He rates depression as a 6 and anxiety as a 4 on a 0-10 scale. He has been social other peers and has been playing card with peers in the milieu.  Patient reports sleep as,\" I am sleeping a lot  better today.\" Patient has been calm and cooperative with staff and peers. Patient has been compliant with medications. Patient has been attending groups. Patient reports appetite as,\" I am eating a lot better now.\" Patient reports no side effects from medications.      Objective  Vitals:    01/31/24 0733   BP: 105/71   Pulse: 68   Resp: 20   Temp: 97.5 °F (36.4 °C)   SpO2: 99%       Previous Psychiatric/Substance Use History      Medical History:  Past Medical History:   Diagnosis Date    ADHD (attention deficit hyperactivity disorder)     Anxiety     Depression     Personality disorder, depressive     Self mutilating behavior         MOSES

## 2024-01-31 NOTE — PROGRESS NOTES
[facility] Adult Unit Daily Assessment  Nursing Progress Note    Room: [facility]/611-01   Name: Willem Ndiaye   Age: 23 y.o.   Gender: female   Dx: Psychosis, unspecified psychosis type (HCC)  Precautions: close watch and suicide risk  Inpatient Status: voluntary     SLEEP:  Sleep Quality Good  Sleep Medications: Yes   PRN Sleep Meds: Yes     MEDICAL:  Other PRN Meds: Yes, Motrin at 1239     Med Compliant: Yes  Accu-Chek: No  Oxygen/CPAP/BiPAP: No  CIWA/CINA: No   PAIN Assessment: location, back pain  Side Effects from medication: No    Metabolic Screening:  Lab Results   Component Value Date    LABA1C 5.4 01/31/2024     Lab Results   Component Value Date    CHOL 115 (L) 01/31/2024    CHOL 122 10/02/2014     Lab Results   Component Value Date    TRIG 69 01/31/2024    TRIG 113 10/02/2014     Lab Results   Component Value Date    HDL 42 (L) 01/31/2024    HDL 41 10/02/2014     No components found for: \"LDLCAL\"  No components found for: \"LABVLDL\"  Body mass index is 21.9 kg/m².  BP Readings from Last 2 Encounters:   01/31/24 105/71   07/31/23 106/84       Medical Bed:   Is patient in a medical bed? no   If medical bed is in use, has nursing secured room while patient is awake and out of the room? NA  Has safety checks by nursing been completed on the bed/room this shift? yes    Protective Factors:  Patient identifies protective factors with nursing staff as follows:   Identifies reasons for living: Yes   Supportive Social Network or family: Yes    Belief that suicide is immoral/high spirituality: No   Responsibility to family or others/living with family: Yes   Fear of death or dying due to pain and suffering: No   Engaged in work or school: Yes  If Patient is unable to identify, reason why?     Depression: 6   Anxiety: 4   SI denies suicidal ideation   Risk of Suicide: No Risk  HI Negative for homicidal ideation        AVH:yes If Hallucinations are present, describe? Whispers this morning    Appetite: good   Percent Meals: 75%

## 2024-01-31 NOTE — PLAN OF CARE
Problem: Safety - Adult  Goal: Free from fall injury  Outcome: Progressing     Problem: Self Harm/Suicidality  Goal: Will have no self-injury during hospital stay  Description: INTERVENTIONS:  1.  Ensure constant observer at bedside with Q15M safety checks  2.  Maintain a safe environment  3.  Secure patient belongings  4.  Ensure family/visitors adhere to safety recommendations  5.  Ensure safety tray has been added to patient's diet order  6.  Every shift and PRN: Re-assess suicidal risk via Frequent Screener    Outcome: Progressing  Flowsheets (Taken 1/31/2024 1529)  Will have no self-injury during hospital stay:   Ensure constant observer at bedside with Q15M safety checks   Maintain a safe environment   Secure patient belongings   Ensure safety tray has been added to patient's diet order   Ensure family/visitors adhere to safety recommendations   Every shift and PRN: Re-assess suicidal risk via Frequent Screener     Problem: Depression  Goal: Will be euthymic at discharge  Description: INTERVENTIONS:  1. Administer medication as ordered  2. Provide emotional support via 1:1 interaction with staff  3. Encourage involvement in milieu/groups/activities  4. Monitor for social isolation  Outcome: Progressing     Problem: Psychosis  Goal: Will report no hallucinations or delusions  Description: INTERVENTIONS:  1. Administer medication as  ordered  2. Assist with reality testing to support increasing orientation  3. Assess if patient's hallucinations or delusions are encouraging self harm or harm to others and intervene as appropriate  Outcome: Progressing     Problem: Anxiety  Goal: Will report anxiety at manageable levels  Description: INTERVENTIONS:  1. Administer medication as ordered  2. Teach and rehearse alternative coping skills  3. Provide emotional support with 1:1 interaction with staff  1/31/2024 8118 by Dillan Barrera RN  Outcome: Progressing  Flowsheets (Taken 1/31/2024 1529)  Will report anxiety at

## 2024-01-31 NOTE — PROGRESS NOTES
Group Note    Date: 01/30/24  Start Time: 7:15 PM   End Time:7:45 PM     Number of Participants: 13    Type of Group: Wrap-Up     Patient's Goal:  made friends    Notes:  see wrap up sheet    Status After Intervention:  Unchanged    Participation Level: Active Listener    Participation Quality: Appropriate    Speech:  normal    Thought Process/Content: Logical    Mood: anxious    Level of consciousness:  Alert and Oriented x4    Response to Learning: Able to verbalize current knowledge/experience    Modes of Intervention: Education and Support    Discipline Responsible: Registered Nurse     Signature:  Amada Daley RN

## 2024-01-31 NOTE — PLAN OF CARE
Problem: Anxiety  Goal: Will report anxiety at manageable levels  Description: INTERVENTIONS:  1. Administer medication as ordered  2. Teach and rehearse alternative coping skills  3. Provide emotional support with 1:1 interaction with staff  Outcome: Progressing   Group Note    Date: 01/30/24  Start Time: 4:00 PM   End Time:4:30 PM     Number of Participants: 10    Type of Group: Activity     Patient's Goal:  Goal: Motivating patient to interact and express different emotions in group.     Note: Encouraged patient to participate and discuss with group members     Participation Level: Active Listener and Interactive    Participation Quality: Appropriate and Sharing    Speech:  normal    Thought Process/Content: Logical    Mood: calm    Level of consciousness:  Alert    Response to Learning: Able to verbalize current knowledge/experience and Able to verbalize/acknowledge new learning    Modes of Intervention: Education and Support    Discipline Responsible: /Counselor     Signature:  VIKKI Davidson

## 2024-01-31 NOTE — GROUP NOTE
Group Therapy Note    Date: 1/31/2024    Group Start Time: 0800  Group End Time: 0830  Group Topic: Community Meeting    [facility] 6 ADULT Maureen Dixon RN; Evelia Valverde    Group Therapy Note                                                                    Group Note    Date: 01/31/24  Start Time: 8:00 AM   End Time:8:30 AM     Number of Participants: 13    Type of Group: Community/Goal     Patient's Goal: eating more and talking more in group     Notes:      Status After Intervention:  Improved    Participation Level: Active Listener    Participation Quality: Appropriate    Speech:  normal    Thought Process/Content: Logical  Linear    Mood:  calm and cooperative    Level of consciousness:  Alert    Response to Learning: Progressing to goal    Modes of Intervention: Education and Support    Discipline Responsible: Behavioral Health Technician     Signature:  Maureen Copeland RN    Electronically signed by Maureen Copeland RN on 1/31/2024 at 8:52 AM

## 2024-01-31 NOTE — PLAN OF CARE
Problem: Anxiety  Goal: Will report anxiety at manageable levels  Outcome: Progressing      Group Therapy Note     Date: 1/31/2024  Start Time: 1100  End Time:  1130  Number of Participants: 12     Type of Group: Psychoeducation     Wellness Binder Information  Module Name:  emotional wellness  Session Number:  5     Patient's Goal:  obstacles to emotional wellness     Notes:  pt acknowledged negative thinking as an obstacle to emotional wellness.     Status After Intervention:  Improved     Participation Level: Interactive     Participation Quality: Appropriate, Attentive, and Sharing        Speech:  normal        Thought Process/Content: Logical        Affective Functioning: Congruent        Mood: congruent        Level of consciousness:  Alert, Oriented x4, and Attentive        Response to Learning: Able to verbalize current knowledge/experience        Endings: None Reported     Modes of Intervention: Education        Discipline Responsible: Psychoeducational Specialist        Signature:  Lauren Cam

## 2024-01-31 NOTE — PROGRESS NOTES
[facility] Adult Unit Daily Assessment  Nursing Progress Note    Room: [facility]611-01   Name: Willem Ndiaye   Age: 23 y.o.   Gender: female   Dx: Psychosis, unspecified psychosis type (HCC)  Precautions: suicide risk  Inpatient Status: voluntary     SLEEP:  Sleep Quality Fair  Sleep Medications: Yes   PRN Sleep Meds: Yes     MEDICAL:  Other PRN Meds: Yes   Med Compliant: Yes  Accu-Chek: No  Oxygen/CPAP/BiPAP: No  CIWA/CINA: No   PAIN Assessment: none  Side Effects from medication: No    Metabolic Screening:  No results found for: \"LABA1C\"  Lab Results   Component Value Date    CHOL 122 10/02/2014     Lab Results   Component Value Date    TRIG 113 10/02/2014     Lab Results   Component Value Date    HDL 41 10/02/2014     No components found for: \"LDLCAL\"  No components found for: \"LABVLDL\"  Body mass index is 21.9 kg/m².  BP Readings from Last 2 Encounters:   01/30/24 100/71   07/31/23 106/84       Medical Bed:   Is patient in a medical bed? no   If medical bed is in use, has nursing secured room while patient is awake and out of the room? NA  Has safety checks by nursing been completed on the bed/room this shift? yes    Protective Factors:  Patient identifies protective factors with nursing staff as follows:   Identifies reasons for living: Yes   Supportive Social Network or family: Yes    Belief that suicide is immoral/high spirituality: No   Responsibility to family or others/living with family: Yes   Fear of death or dying due to pain and suffering: No   Engaged in work or school: Yes  If Patient is unable to identify, reason why?     Depression: 6   Anxiety: 4   SI denies suicidal ideation   Risk of Suicide: No Risk  HI Negative for homicidal ideation        AVH:no If Hallucinations are present, describe?     Appetite: good   Percent Meals:    Social: Yes   Speech: normal   Appearance: appropriately dressed    GROUP:  Group Participation: Yes  Participation Quality: Minimal    Notes:     Patient has been social and out

## 2024-01-31 NOTE — H&P
HISTORY and PHYSICAL      CHIEF COMPLAINT:  Psychosis, SI    Reason for Admission:  Psychosis, SI    History Obtained From:  patient, chart    HISTORY OF PRESENT ILLNESS:      The patient is a 23 y.o. female who is admitted to the [facility] unit with worsening mood issues. The patient has no c/o chest pain or SOA. No abdominal pain or N/V. No dysuria. No recent fevers. No HA or dizziness.     Past Medical History:        Diagnosis Date    ADHD (attention deficit hyperactivity disorder)     Anxiety     Depression     Personality disorder, depressive     Self mutilating behavior      Past Surgical History:        Procedure Laterality Date    MASTECTOMY, BILATERAL Bilateral 08/02/2022         Medications Prior to Admission:    Medications Prior to Admission: Multiple Vitamins-Minerals (THERAPEUTIC MULTIVITAMIN-MINERALS) tablet, Take 1 tablet by mouth daily  amphetamine-dextroamphetamine (ADDERALL XR) 30 MG extended release capsule, Take one capsule daily  B-D 3CC LUER-IRMA SYR 22GX1\" 22G X 1\" 3 ML MISC,   Syringe, Disposable, 3 ML MISC, Use as directed for monthly testosterone injections.  Please dispense with 1 inch needle  testosterone cypionate (DEPOTESTOTERONE CYPIONATE) 200 MG/ML injection, Inject 0.5 mLs into the muscle every 14 days.    Allergies:  Prozac [fluoxetine], Milk-related compounds, Pristiq [desvenlafaxine], Wheat dextrin, and Zoloft [sertraline hcl]    Social History:   TOBACCO:   reports that she has never smoked. She has never used smokeless tobacco.  ETOH:   reports current alcohol use.  DRUGS:   reports current drug use. Drug: Marijuana (Weed).  MARITAL STATUS:  not   OCCUPATION:  working  Patient currently lives with family       Family History:   History reviewed. No pertinent family history.  REVIEW OF SYSTEMS:  Constitutional: neg  CV: neg  Pulmonary: neg  GI: neg  : neg  Psych: psychosis. SI  Neuro: neg  Skin: neg  MusculoSkeletal:
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
## 2024-02-01 VITALS
DIASTOLIC BLOOD PRESSURE: 82 MMHG | SYSTOLIC BLOOD PRESSURE: 111 MMHG | RESPIRATION RATE: 18 BRPM | HEIGHT: 65 IN | OXYGEN SATURATION: 99 % | WEIGHT: 131.6 LBS | TEMPERATURE: 98.2 F | BODY MASS INDEX: 21.92 KG/M2 | HEART RATE: 83 BPM

## 2024-02-01 PROCEDURE — 6370000000 HC RX 637 (ALT 250 FOR IP): Performed by: NURSE PRACTITIONER

## 2024-02-01 PROCEDURE — 99239 HOSP IP/OBS DSCHRG MGMT >30: CPT | Performed by: NURSE PRACTITIONER

## 2024-02-01 PROCEDURE — 5130000000 HC BRIDGE APPOINTMENT

## 2024-02-01 PROCEDURE — 6370000000 HC RX 637 (ALT 250 FOR IP): Performed by: FAMILY MEDICINE

## 2024-02-01 RX ORDER — HYDROXYZINE HYDROCHLORIDE 25 MG/1
25 TABLET, FILM COATED ORAL 3 TIMES DAILY PRN
Qty: 30 TABLET | Refills: 0 | Status: SHIPPED | OUTPATIENT
Start: 2024-02-01 | End: 2024-02-11

## 2024-02-01 RX ORDER — OLANZAPINE 7.5 MG/1
7.5 TABLET, FILM COATED ORAL NIGHTLY
Qty: 30 TABLET | Refills: 0 | Status: SHIPPED | OUTPATIENT
Start: 2024-02-01

## 2024-02-01 RX ORDER — ERGOCALCIFEROL 1.25 MG/1
50000 CAPSULE ORAL WEEKLY
Qty: 5 CAPSULE | Refills: 0 | Status: SHIPPED | OUTPATIENT
Start: 2024-02-07 | End: 2024-04-18

## 2024-02-01 RX ORDER — LAMOTRIGINE 25 MG/1
50 TABLET ORAL DAILY
Qty: 60 TABLET | Refills: 0 | Status: SHIPPED | OUTPATIENT
Start: 2024-02-02

## 2024-02-01 RX ADMIN — LAMOTRIGINE 25 MG: 25 TABLET ORAL at 08:23

## 2024-02-01 RX ADMIN — CYANOCOBALAMIN TAB 500 MCG 500 MCG: 500 TAB at 08:23

## 2024-02-01 NOTE — DISCHARGE SUMMARY
STABLE  SPEAKS IN FULL SENTENCES WITHOUT SHORTNESS OF AIR    Disposition: home    Patient Instructions:   Discharge Medication List as of 2/1/2024 12:59 PM        START taking these medications    Details   hydrOXYzine HCl (ATARAX) 25 MG tablet Take 1 tablet by mouth 3 times daily as needed for Itching, Disp-30 tablet, R-0Normal      lamoTRIgine (LAMICTAL) 25 MG tablet Take 2 tablets by mouth daily, Disp-60 tablet, R-0Normal      OLANZapine (ZYPREXA) 7.5 MG tablet Take 1 tablet by mouth nightly, Disp-30 tablet, R-0Normal      Ergocalciferol (VITAMIN D) 09484 units CAPS Take 50,000 Units by mouth once a week for 11 doses, Disp-5 capsule, R-0Normal           CONTINUE these medications which have NOT CHANGED    Details   Multiple Vitamins-Minerals (THERAPEUTIC MULTIVITAMIN-MINERALS) tablet Take 1 tablet by mouth dailyHistorical Med      amphetamine-dextroamphetamine (ADDERALL XR) 30 MG extended release capsule Take one capsule daily, Disp-90 capsule, R-0Normal      B-D 3CC LUER-IRMA SYR 22GX1\" 22G X 1\" 3 ML MISC Starting Wed 1/20/2021, LOLI, Historical Med      Syringe, Disposable, 3 ML MISC Historical Med      testosterone cypionate (DEPOTESTOTERONE CYPIONATE) 200 MG/ML injection Inject 0.5 mLs into the muscle every 14 days.Historical Med           STOP taking these medications       Lisdexamfetamine Dimesylate (VYVANSE) 40 MG CAPS Comments:   Reason for Stopping: PATIENT REPORTS NO LONGER TAKING THIS MEDICATION        vilazodone HCl (VIIBRYD) 20 MG TABS Comments:   Reason for Stopping: PATIENT REPORTS NO LONGER TAKING THIS MEDICATION        Vilazodone HCl (VIIBRYD STARTER PACK) 10 & 20 MG KIT Comments:   Reason for Stopping: PATIENT REPORTS NO LONGER TAKING THIS MEDICATION        Vilazodone HCl 10 & 20 MG KIT Comments:   Reason for Stopping: PATIENT REPORTS NO LONGER TAKING THIS MEDICATION        medroxyPROGESTERone (DEPO-PROVERA) 150 MG/ML injection Comments:   Reason for Stopping: PATIENT REPORTS NO LONGER TAKING  THIS MEDICATION            Activity: activity as tolerated  Diet: regular diet  Wound Care: none needed    Follow-up with   PCP in 2 weeks.    FAYE PRIEC ON 2/13/2024 AT 11:30 AM AND [facility]     Time worked: More than 31 minutes    Participation:good    Electronically signed by Viviana Mark, PMHNP-BC, FNP-C on 2/1/2024 at 5:26 PM

## 2024-02-01 NOTE — PROGRESS NOTES
Progress Note  Willem Ndiaye  2/1/2024 8:29 AM  Subjective:   Admit Date:   1/29/2024      CC/ADMIT DX:       Interval History:   Reviewed overnight events and nursing notes. The patient has had no new medical issues.     I have reviewed all labs/diagnostics from the last 24hrs.       ROS:   I have done a 10 point ROS and all are negative, except what is mentioned in the HPI.    ADULT DIET; Regular; Safety Tray; Safety Tray (Disposables)    Medications:      vitamin D  50,000 Units Oral Weekly    vitamin B-12  500 mcg Oral Daily    OLANZapine  7.5 mg Oral Nightly    lamoTRIgine  25 mg Oral Daily    melatonin  5 mg Oral Nightly           Objective:   Vitals: /82   Pulse 83   Temp 98.2 °F (36.8 °C)   Resp 18   Ht 1.651 m (5' 5\")   Wt 59.7 kg (131 lb 9.6 oz)   SpO2 99%   BMI 21.90 kg/m²  No intake or output data in the 24 hours ending 02/01/24 0829  General appearance: alert and cooperative with exam  Extremities: extremities normal, atraumatic, no cyanosis or edema  Neurologic:  No obvious focal neurologic deficits.    Assessment and Plan:   Principal Problem:    Psychosis, unspecified psychosis type (HCC)  Active Problems:    Cannabis use disorder    Insomnia  Resolved Problems:    * No resolved hospital problems. *    Vit D Def    Plan:   Continue present medication(s)    Replace Vit D   Follow with Psych      Discharge planning:   home     Reviewed treatment plans with the patient and/or family.             Electronically signed by Az Kraus MD on 2/1/2024 at 8:29 AM

## 2024-02-01 NOTE — DISCHARGE INSTR - DIET
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
## 2024-02-01 NOTE — PROGRESS NOTES
SW spoke with patient to confirm safe discharge plans. Patient reports that his mother and father will be providing him with safe transportation home. Declines needing transportation assistance at this time. Patient reports he will be returning home with mother with whom he lives with.

## 2024-02-01 NOTE — PLAN OF CARE
Problem: Safety - Adult  Goal: Free from fall injury  Outcome: Adequate for Discharge     Problem: Self Harm/Suicidality  Goal: Will have no self-injury during hospital stay  Description: INTERVENTIONS:  1.  Ensure constant observer at bedside with Q15M safety checks  2.  Maintain a safe environment  3.  Secure patient belongings  4.  Ensure family/visitors adhere to safety recommendations  5.  Ensure safety tray has been added to patient's diet order  6.  Every shift and PRN: Re-assess suicidal risk via Frequent Screener    Outcome: Adequate for Discharge     Problem: Depression  Goal: Will be euthymic at discharge  Description: INTERVENTIONS:  1. Administer medication as ordered  2. Provide emotional support via 1:1 interaction with staff  3. Encourage involvement in milieu/groups/activities  4. Monitor for social isolation  Outcome: Adequate for Discharge     Problem: Psychosis  Goal: Will report no hallucinations or delusions  Description: INTERVENTIONS:  1. Administer medication as  ordered  2. Assist with reality testing to support increasing orientation  3. Assess if patient's hallucinations or delusions are encouraging self harm or harm to others and intervene as appropriate  Outcome: Adequate for Discharge     Problem: Anxiety  Goal: Will report anxiety at manageable levels  Description: INTERVENTIONS:  1. Administer medication as ordered  2. Teach and rehearse alternative coping skills  3. Provide emotional support with 1:1 interaction with staff  Outcome: Adequate for Discharge     Problem: Sleep Disturbance  Goal: Will exhibit normal sleeping pattern  Description: INTERVENTIONS:  1. Administer medication as ordered  2. Decrease environmental stimuli, including noise, as appropriate  3. Discourage social isolation and naps during the day  Outcome: Adequate for Discharge

## 2024-02-01 NOTE — PROGRESS NOTES
Behavioral Health   Discharge Note  Bridge Appointment completed: Reviewed Discharge Instructions with patient.    Patient verbalizes understanding and agreement with the discharge plan using the teachback method.     Referral for Outpatient Tobacco Cessation Counseling, upon discharge (m[place] X if applicable and completed):    ( )  Hospital staff assisted patient to call Quit Line or faxed referral                                   during hospitalization                  ( )  Recognizing danger situations (included triggers and roadblocks), if not completed on admission                    ( )  Coping skills (new ways to manage stress, exercise, relaxation techniques, changing routine, distraction), if not completed on admission                                                           ( )  Basic information about quitting (benefits of quitting, techniques in how to quit, available resources, if not completed on admission  ( ) [facility]   ( ) Patient refused referral  ( X) Patient refused counseling  ( X) Patient refused smoking cessation medication upon discharge    Vaccinations (m[place] X if applicable and completed):  ( ) Patient states already received influenza vaccine elsewhere  ( ) Patient received influenza vaccine during this hospitalization  ( X) Patient refused influenza vaccine at this time      Pt discharged with followings belongings:  Dental Appliances: None  Vision - Corrective Lenses: None  Hearing Aid: None  Jewelry: None  Body Piercings Removed: No  Clothing: Sent home  Other Valuables: Sent home     Valuables retrieved from [facility] and returned to patient.    Patient left department with staff  via  ambulatory, discharged to home  .   Patient education on aftercare instructions: Yes    Patient verbalize understanding of AVS:  Yes.    Suicidal Ideations? No Risk of Suicide: No Risk   HI? Negative for homicidal ideation       AVH? No    Status EXAM upon  discharge:  Mental Status and Behavioral Exam  Normal: No  Level of Assistance: Independent/Self  Facial Expression: Flat  Affect: Congruent  Level of Consciousness: Alert  Frequency of Checks: 4 times per hour, close  Mood:Normal: No  Mood: Depressed, Anxious  Motor Activity:Normal: Yes  Eye Contact: Fair  Observed Behavior: Cooperative  Sexual Misconduct History: Current - no  Preception: [place] to person, [place] to time, [place] to place, [place] to situation  Attention:Normal: No  Attention: Distractible  Thought Processes: Circumstantial  Thought Content:Normal: No  Thought Content: Preoccupations  Depression Symptoms: Change in energy level, Appetite change  Anxiety Symptoms: Generalized  Nadeen Symptoms: No problems reported or observed.  Hallucinations: None  Delusions: No  Memory:Normal: Yes  Insight and Judgment: No  Insight and Judgment: Other (comment) (limited insight, limited judgment)    AVS/Transition Record has been discussed with patient and a copy was given to the patient.  The AVS/Transition Record was faxed to the next level of care today.

## 2024-02-01 NOTE — PROGRESS NOTES
[facility] Adult Unit Daily Assessment  Nursing Progress Note    Room: [facility]611-01   Name: Willem Ndiaye   Age: 23 y.o.   Gender: female   Dx: Psychosis, unspecified psychosis type (HCC)  Precautions: suicide risk  Inpatient Status: voluntary     SLEEP:  Sleep Quality Good  Sleep Medications: Yes   PRN Sleep Meds: No     MEDICAL:  Other PRN Meds: No   Med Compliant: Yes  Accu-Chek: No  Oxygen/CPAP/BiPAP: No  CIWA/CINA: No   PAIN Assessment: none  Side Effects from medication: No    Metabolic Screening:  Lab Results   Component Value Date    LABA1C 5.4 01/31/2024     Lab Results   Component Value Date    CHOL 115 (L) 01/31/2024    CHOL 122 10/02/2014     Lab Results   Component Value Date    TRIG 69 01/31/2024    TRIG 113 10/02/2014     Lab Results   Component Value Date    HDL 42 (L) 01/31/2024    HDL 41 10/02/2014     No components found for: \"LDLCAL\"  No components found for: \"LABVLDL\"  Body mass index is 21.9 kg/m².  BP Readings from Last 2 Encounters:   01/31/24 109/82   07/31/23 106/84       Medical Bed:   Is patient in a medical bed? no   If medical bed is in use, has nursing secured room while patient is awake and out of the room? NA  Has safety checks by nursing been completed on the bed/room this shift? NA    Protective Factors:  Patient identifies protective factors with nursing staff as follows:   Identifies reasons for living: Yes   Supportive Social Network or family: Yes    Belief that suicide is immoral/high spirituality: No   Responsibility to family or others/living with family: Yes   Fear of death or dying due to pain and suffering: No   Engaged in work or school: Yes  If Patient is unable to identify, reason why?     Depression: 3-4   Anxiety: 1-2   SI denies suicidal ideation   Risk of Suicide: No Risk  HI Negative for homicidal ideation        AVH:no If Hallucinations are present, describe? denies    Appetite: good   Percent Meals:    Social: No   Speech: normal   Appearance: appropriately

## 2024-02-01 NOTE — PROGRESS NOTES
Discharge Note     Patient is discharging on this date. Patient denies SI, HI, and AVH at this time. Patient reports improvement in behavior and is leaving unit in overall good condition. SW and patient discussed patient's follow up appointments and importance of attending appointments as scheduled, patient voiced understanding and agreement. Patient and SW also discussed patient's safety plan and patient was able to verbally identify: warning signs, coping strategies, places and people that help make the patient feel better/distract negative thoughts, friends/family/agencies/professionals the patient can reach out to in a crisis, and something that is important to the patient/worth living for. Patient was provided the national suicide prevention hotline number (1-723.612.4396) as well as local community behavioral health ([facility]) crisis number for emergencies (1-484.824.2821).     Discharge Disposition: home -lives with family      [facility]  on February 13 , 2024 at 11:30 AM for the intake appointment. [facility]  Patient refused referral to outpatient tobacco cessation counseling    SW offered to assist patient with transportation, patient declined transportation assistance

## 2024-02-01 NOTE — PROGRESS NOTES
Group Note    Date: 01/31/24  Start Time: 7:15 PM   End Time:7:45 PM     Number of Participants: 9    Type of Group: Wrap-Up     Patient's Goal:  none listed    Notes:  see pt's chart    Status After Intervention:  Unchanged    Participation Level: Interactive    Participation Quality: Appropriate    Speech:  normal    Thought Process/Content: Logical    Mood: anxious and depressed    Level of consciousness:  Alert and Oriented x4    Response to Learning: Progressing to goal    Modes of Intervention: Education and Support    Discipline Responsible: Registered Nurse     Signature:  Rubi Singh RN

## 2024-02-01 NOTE — PROGRESS NOTES
Group Therapy Note    Date: 2/1/2024  Start Time: 0800  End Time:  0830  Number of Participants: 15    Type of Group: Community Meeting    Wellness Binder Information  Module Name:  self inventory  Session Number:  1    Patient's Goal:  being positive      Status After Intervention:  Unchanged    Participation Level: Active Listener    Participation Quality: Appropriate      Speech:  normal      Thought Process/Content: Logical  Linear      Affective Functioning: Congruent      Mood: calm      Level of consciousness:  Alert and Oriented x4      Response to Learning: Able to verbalize current knowledge/experience and Able to verbalize/acknowledge new learning      Endings: None Reported    Modes of Intervention: Support and Exploration      Discipline Responsible: Registered Nurse      Signature:  Dillan Barrera RN

## 2024-02-02 NOTE — PROGRESS NOTES
Progress Note  Willem Ndiaye  2/2/2024 8:41 AM  Subjective:   Admit Date:   1/29/2024      CC/ADMIT DX:       Interval History:   Reviewed overnight events and nursing notes. The patient denies any new physical complaints.     I have reviewed all labs/diagnostics from the last 24hrs.       ROS:   I have done a 10 point ROS and all are negative, except what is mentioned in the HPI.    No diet orders on file    Medications:               Objective:   Vitals: /82   Pulse 83   Temp 98.2 °F (36.8 °C)   Resp 18   Ht 1.651 m (5' 5\")   Wt 59.7 kg (131 lb 9.6 oz)   SpO2 99%   BMI 21.90 kg/m²  No intake or output data in the 24 hours ending 02/02/24 0841  General appearance: alert and cooperative with exam  Extremities: extremities normal, atraumatic, no cyanosis or edema  Neurologic:  No obvious focal neurologic deficits.    Assessment and Plan:   Principal Problem:    Psychosis, unspecified psychosis type (HCC)  Active Problems:    Cannabis use disorder    Insomnia  Resolved Problems:    * No resolved hospital problems. *    Vit D Def    Plan:   Continue present medication(s)    She is medically stable. I will monitor for any changes or concerns.    Follow with Psych      Discharge planning:   home     Reviewed treatment plans with the patient and/or family.             Electronically signed by Az Kraus MD on 2/2/2024 at 8:41 AM

## 2024-03-26 DIAGNOSIS — F90.9 ATTENTION DEFICIT HYPERACTIVITY DISORDER (ADHD), UNSPECIFIED ADHD TYPE: ICD-10-CM

## 2024-03-27 RX ORDER — DEXTROAMPHETAMINE SACCHARATE, AMPHETAMINE ASPARTATE MONOHYDRATE, DEXTROAMPHETAMINE SULFATE AND AMPHETAMINE SULFATE 7.5; 7.5; 7.5; 7.5 MG/1; MG/1; MG/1; MG/1
CAPSULE, EXTENDED RELEASE ORAL
Qty: 90 CAPSULE | Refills: 0 | Status: SHIPPED | OUTPATIENT
Start: 2024-03-27 | End: 2024-08-20

## 2024-03-27 NOTE — TELEPHONE ENCOUNTER
Willem JORGE LUIS Ndiaye called to request a refill on his medication.      Last office visit : 1/4/2024   Next office visit : Visit date not found     Last UDS:   Amphetamine Screen, Urine   Date Value Ref Range Status   01/29/2024 POSITIVE (A) Negative <500 ng/mL Final     Benzodiazepine Screen, Urine   Date Value Ref Range Status   01/29/2024 Negative Negative <150 ng/mL Final     Buprenorphine Urine   Date Value Ref Range Status   01/29/2024 Negative Negative <10 ng/mL Final     Cocaine Metabolite Screen, Urine   Date Value Ref Range Status   01/29/2024 Negative Negative <150 ng/mL Final     Methamphetamine, Urine   Date Value Ref Range Status   01/29/2024 Negative Negative <500 ng/mL Final     Opiate Scrn, Ur   Date Value Ref Range Status   01/29/2024 Negative Negative < 100 ng/mL Final     PCP Screen, Urine   Date Value Ref Range Status   01/29/2024 Negative Negative <25 ng/mL Final         Medication Contract: na    Last Fill: 1/4/24     Requested Prescriptions     Pending Prescriptions Disp Refills    amphetamine-dextroamphetamine (ADDERALL XR) 30 MG extended release capsule 90 capsule 0     Sig: Take one capsule daily         Please approve or refuse this medication.   Cat Bourgeois LPN

## 2024-05-16 ENCOUNTER — OFFICE VISIT (OUTPATIENT)
Dept: PRIMARY CARE CLINIC | Age: 24
End: 2024-05-16
Payer: COMMERCIAL

## 2024-05-16 VITALS
SYSTOLIC BLOOD PRESSURE: 136 MMHG | TEMPERATURE: 97.9 F | RESPIRATION RATE: 18 BRPM | BODY MASS INDEX: 25.83 KG/M2 | HEART RATE: 100 BPM | OXYGEN SATURATION: 98 % | DIASTOLIC BLOOD PRESSURE: 84 MMHG | HEIGHT: 65 IN | WEIGHT: 155 LBS

## 2024-05-16 DIAGNOSIS — N89.8 VAGINAL DISCHARGE: ICD-10-CM

## 2024-05-16 DIAGNOSIS — R50.9 FEVER, UNSPECIFIED FEVER CAUSE: ICD-10-CM

## 2024-05-16 DIAGNOSIS — J02.9 SORE THROAT: ICD-10-CM

## 2024-05-16 DIAGNOSIS — J06.9 URI, ACUTE: Primary | ICD-10-CM

## 2024-05-16 DIAGNOSIS — Z11.3 SCREENING FOR STD (SEXUALLY TRANSMITTED DISEASE): ICD-10-CM

## 2024-05-16 LAB
C TRACH DNA UR QL NAA+PROBE: NOT DETECTED
HBV SURFACE AG SERPL QL IA: NORMAL
HCV AB SERPL QL IA: NORMAL
HIV-1 P24 AG: NORMAL
HIV1+2 AB SERPLBLD QL IA.RAPID: NORMAL
N GONORRHOEA DNA UR QL NAA+PROBE: NOT DETECTED
S PYO AG THROAT QL: NORMAL
T VAGINALIS DNA UR QL NAA+PROBE: NOT DETECTED

## 2024-05-16 PROCEDURE — 99213 OFFICE O/P EST LOW 20 MIN: CPT | Performed by: FAMILY MEDICINE

## 2024-05-16 PROCEDURE — 87880 STREP A ASSAY W/OPTIC: CPT | Performed by: FAMILY MEDICINE

## 2024-05-16 RX ORDER — LAMOTRIGINE 100 MG/1
100 TABLET ORAL DAILY
COMMUNITY

## 2024-05-16 ASSESSMENT — ENCOUNTER SYMPTOMS
COLOR CHANGE: 0
VOMITING: 0
ABDOMINAL PAIN: 0
NAUSEA: 0
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
## 2024-05-20 LAB — RPR SER QL: NORMAL

## 2024-08-24 ENCOUNTER — HOSPITAL ENCOUNTER (INPATIENT)
Age: 24
LOS: 3 days | Discharge: HOME | DRG: 885 | End: 2024-08-28
Attending: PEDIATRICS | Admitting: PSYCHIATRY & NEUROLOGY
Payer: COMMERCIAL

## 2024-08-24 DIAGNOSIS — R45.851 DEPRESSION WITH SUICIDAL IDEATION: Primary | ICD-10-CM

## 2024-08-24 DIAGNOSIS — F32.A DEPRESSION WITH SUICIDAL IDEATION: Primary | ICD-10-CM

## 2024-08-24 LAB
ALBUMIN SERPL-MCNC: 4.3 G/DL (ref 3.5–5.2)
ALP SERPL-CCNC: 87 U/L (ref 35–104)
ALT SERPL-CCNC: 20 U/L (ref 5–33)
AMPHET UR QL SCN: NEGATIVE
ANION GAP SERPL CALCULATED.3IONS-SCNC: 14 MMOL/L (ref 7–19)
AST SERPL-CCNC: 28 U/L (ref 5–32)
BARBITURATES UR QL SCN: NEGATIVE
BASOPHILS # BLD: 0 K/UL (ref 0–0.2)
BASOPHILS NFR BLD: 0.1 % (ref 0–1)
BENZODIAZ UR QL SCN: NEGATIVE
BILIRUB SERPL-MCNC: 0.5 MG/DL (ref 0.2–1.2)
BUN SERPL-MCNC: 16 MG/DL (ref 6–20)
BUPRENORPHINE URINE: NEGATIVE
CALCIUM SERPL-MCNC: 9.2 MG/DL (ref 8.6–10)
CANNABINOIDS UR QL SCN: POSITIVE
CHLORIDE SERPL-SCNC: 102 MMOL/L (ref 98–111)
CO2 SERPL-SCNC: 22 MMOL/L (ref 22–29)
COCAINE UR QL SCN: NEGATIVE
CREAT SERPL-MCNC: 0.7 MG/DL (ref 0.5–0.9)
DRUG SCREEN COMMENT UR-IMP: ABNORMAL
EOSINOPHIL # BLD: 0 K/UL (ref 0–0.6)
EOSINOPHIL NFR BLD: 0 % (ref 0–5)
ERYTHROCYTE [DISTWIDTH] IN BLOOD BY AUTOMATED COUNT: 14.3 % (ref 11.5–14.5)
ETHANOLAMINE SERPL-MCNC: 47 MG/DL (ref 0–0.08)
FENTANYL SCREEN, URINE: NEGATIVE
GLUCOSE SERPL-MCNC: 99 MG/DL (ref 70–99)
HCG SERPL QL: NEGATIVE
HCT VFR BLD AUTO: 39.5 % (ref 37–47)
HGB BLD-MCNC: 13 G/DL (ref 12–16)
IMM GRANULOCYTES # BLD: 0 K/UL
LYMPHOCYTES # BLD: 1.8 K/UL (ref 1.1–4.5)
LYMPHOCYTES NFR BLD: 19.8 % (ref 20–40)
MCH RBC QN AUTO: 28.6 PG (ref 27–31)
MCHC RBC AUTO-ENTMCNC: 32.9 G/DL (ref 33–37)
MCV RBC AUTO: 86.8 FL (ref 81–99)
METHADONE UR QL SCN: NEGATIVE
METHAMPHETAMINE, URINE: NEGATIVE
MONOCYTES # BLD: 1 K/UL (ref 0–0.9)
MONOCYTES NFR BLD: 10.7 % (ref 0–10)
NEUTROPHILS # BLD: 6.3 K/UL (ref 1.5–7.5)
NEUTS SEG NFR BLD: 69.1 % (ref 50–65)
OPIATES UR QL SCN: NEGATIVE
OXYCODONE UR QL SCN: NEGATIVE
PCP UR QL SCN: NEGATIVE
PLATELET # BLD AUTO: 324 K/UL (ref 130–400)
PMV BLD AUTO: 8.5 FL (ref 9.4–12.3)
POTASSIUM SERPL-SCNC: 3.8 MMOL/L (ref 3.5–5)
PROT SERPL-MCNC: 7.2 G/DL (ref 6.6–8.7)
RBC # BLD AUTO: 4.55 M/UL (ref 4.2–5.4)
SODIUM SERPL-SCNC: 138 MMOL/L (ref 136–145)
TRICYCLIC ANTIDEPRESSANTS, UR: NEGATIVE
WBC # BLD AUTO: 9.1 K/UL (ref 4.8–10.8)

## 2024-08-24 PROCEDURE — 87635 SARS-COV-2 COVID-19 AMP PRB: CPT

## 2024-08-24 PROCEDURE — 82077 ASSAY SPEC XCP UR&BREATH IA: CPT

## 2024-08-24 PROCEDURE — 84703 CHORIONIC GONADOTROPIN ASSAY: CPT

## 2024-08-24 PROCEDURE — G0480 DRUG TEST DEF 1-7 CLASSES: HCPCS

## 2024-08-24 PROCEDURE — 36415 COLL VENOUS BLD VENIPUNCTURE: CPT

## 2024-08-24 PROCEDURE — 80307 DRUG TEST PRSMV CHEM ANLYZR: CPT

## 2024-08-24 PROCEDURE — 80053 COMPREHEN METABOLIC PANEL: CPT

## 2024-08-24 PROCEDURE — 99285 EMERGENCY DEPT VISIT HI MDM: CPT

## 2024-08-24 PROCEDURE — 85025 COMPLETE CBC W/AUTO DIFF WBC: CPT

## 2024-08-24 ASSESSMENT — PAIN - FUNCTIONAL ASSESSMENT: PAIN_FUNCTIONAL_ASSESSMENT: NONE - DENIES PAIN

## 2024-08-25 PROBLEM — F32.A DEPRESSION WITH SUICIDAL IDEATION: Status: ACTIVE | Noted: 2024-08-25

## 2024-08-25 PROBLEM — F39 UNSPECIFIED MOOD (AFFECTIVE) DISORDER (HCC): Status: ACTIVE | Noted: 2024-08-25

## 2024-08-25 PROBLEM — R45.851 DEPRESSION WITH SUICIDAL IDEATION: Status: ACTIVE | Noted: 2024-08-25

## 2024-08-25 LAB — SARS-COV-2 RDRP RESP QL NAA+PROBE: NOT DETECTED

## 2024-08-25 PROCEDURE — 6370000000 HC RX 637 (ALT 250 FOR IP): Performed by: HOSPITALIST

## 2024-08-25 PROCEDURE — 99223 1ST HOSP IP/OBS HIGH 75: CPT | Performed by: PSYCHIATRY & NEUROLOGY

## 2024-08-25 PROCEDURE — 1240000000 HC EMOTIONAL WELLNESS R&B

## 2024-08-25 PROCEDURE — 6370000000 HC RX 637 (ALT 250 FOR IP): Performed by: PSYCHIATRY & NEUROLOGY

## 2024-08-25 RX ORDER — NICOTINE 21 MG/24HR
1 PATCH, TRANSDERMAL 24 HOURS TRANSDERMAL DAILY
Status: DISCONTINUED | OUTPATIENT
Start: 2024-08-25 | End: 2024-08-28 | Stop reason: HOSPADM

## 2024-08-25 RX ORDER — POLYETHYLENE GLYCOL 3350 17 G
2 POWDER IN PACKET (EA) ORAL
Status: DISCONTINUED | OUTPATIENT
Start: 2024-08-25 | End: 2024-08-28 | Stop reason: HOSPADM

## 2024-08-25 RX ORDER — LAMOTRIGINE 100 MG/1
100 TABLET ORAL DAILY
Status: DISCONTINUED | OUTPATIENT
Start: 2024-08-25 | End: 2024-08-25

## 2024-08-25 RX ORDER — M-VIT,TX,IRON,MINS/CALC/FOLIC 27MG-0.4MG
1 TABLET ORAL DAILY
Status: DISCONTINUED | OUTPATIENT
Start: 2024-08-25 | End: 2024-08-28 | Stop reason: HOSPADM

## 2024-08-25 RX ORDER — POLYETHYLENE GLYCOL 3350 17 G/17G
17 POWDER, FOR SOLUTION ORAL DAILY PRN
Status: DISCONTINUED | OUTPATIENT
Start: 2024-08-25 | End: 2024-08-28 | Stop reason: HOSPADM

## 2024-08-25 RX ORDER — MECOBALAMIN 5000 MCG
5 TABLET,DISINTEGRATING ORAL NIGHTLY PRN
Status: DISCONTINUED | OUTPATIENT
Start: 2024-08-25 | End: 2024-08-28 | Stop reason: HOSPADM

## 2024-08-25 RX ORDER — ACETAMINOPHEN 325 MG/1
650 TABLET ORAL EVERY 4 HOURS PRN
Status: DISCONTINUED | OUTPATIENT
Start: 2024-08-25 | End: 2024-08-28 | Stop reason: HOSPADM

## 2024-08-25 RX ORDER — HYDROXYZINE HYDROCHLORIDE 25 MG/1
25 TABLET, FILM COATED ORAL 3 TIMES DAILY PRN
Status: DISCONTINUED | OUTPATIENT
Start: 2024-08-25 | End: 2024-08-28 | Stop reason: HOSPADM

## 2024-08-25 RX ORDER — TRAZODONE HYDROCHLORIDE 50 MG/1
50 TABLET, FILM COATED ORAL NIGHTLY PRN
Status: DISCONTINUED | OUTPATIENT
Start: 2024-08-25 | End: 2024-08-28 | Stop reason: HOSPADM

## 2024-08-25 RX ORDER — BUPROPION HYDROCHLORIDE 150 MG/1
150 TABLET ORAL DAILY
Status: DISCONTINUED | OUTPATIENT
Start: 2024-08-25 | End: 2024-08-28 | Stop reason: HOSPADM

## 2024-08-25 RX ADMIN — HYDROXYZINE HYDROCHLORIDE 25 MG: 25 TABLET ORAL at 20:39

## 2024-08-25 RX ADMIN — Medication 5 MG: at 20:39

## 2024-08-25 RX ADMIN — TRAZODONE HYDROCHLORIDE 50 MG: 50 TABLET ORAL at 01:33

## 2024-08-25 RX ADMIN — BUPROPION HYDROCHLORIDE 150 MG: 150 TABLET, EXTENDED RELEASE ORAL at 13:21

## 2024-08-25 RX ADMIN — Medication 1 TABLET: at 07:55

## 2024-08-25 RX ADMIN — TRAZODONE HYDROCHLORIDE 50 MG: 50 TABLET ORAL at 20:39

## 2024-08-25 RX ADMIN — Medication 5 MG: at 01:33

## 2024-08-25 RX ADMIN — HYDROXYZINE HYDROCHLORIDE 25 MG: 25 TABLET ORAL at 01:33

## 2024-08-25 RX ADMIN — LAMOTRIGINE 100 MG: 100 TABLET ORAL at 07:55

## 2024-08-25 SDOH — HEALTH STABILITY: PHYSICAL HEALTH: ON AVERAGE, HOW MANY MINUTES DO YOU ENGAGE IN EXERCISE AT THIS LEVEL?: 0 MIN

## 2024-08-25 SDOH — HEALTH STABILITY: PHYSICAL HEALTH: ON AVERAGE, HOW MANY DAYS PER WEEK DO YOU ENGAGE IN MODERATE TO STRENUOUS EXERCISE (LIKE A BRISK WALK)?: 0 DAYS

## 2024-08-25 ASSESSMENT — SLEEP AND FATIGUE QUESTIONNAIRES
DO YOU HAVE DIFFICULTY SLEEPING: YES
DO YOU USE A SLEEP AID: NO
SLEEP PATTERN: NIGHTMARES/TERRORS
AVERAGE NUMBER OF SLEEP HOURS: 6

## 2024-08-25 ASSESSMENT — SOCIAL DETERMINANTS OF HEALTH (SDOH)
HOW OFTEN DO YOU ATTEND CHURCH OR RELIGIOUS SERVICES?: NEVER
HOW OFTEN DO YOU ATTENT MEETINGS OF THE CLUB OR ORGANIZATION YOU BELONG TO?: NEVER
IN A TYPICAL WEEK, HOW MANY TIMES DO YOU TALK ON THE PHONE WITH FAMILY, FRIENDS, OR NEIGHBORS?: THREE TIMES A WEEK
WITHIN THE LAST YEAR, HAVE YOU BEEN HUMILIATED OR EMOTIONALLY ABUSED IN OTHER WAYS BY YOUR PARTNER OR EX-PARTNER?: NO
HOW OFTEN DO YOU GET TOGETHER WITH FRIENDS OR RELATIVES?: THREE TIMES A WEEK
ARE YOU MARRIED, WIDOWED, DIVORCED, SEPARATED, NEVER MARRIED, OR LIVING WITH A PARTNER?: NEVER MARRIED
HOW HARD IS IT FOR YOU TO PAY FOR THE VERY BASICS LIKE FOOD, HOUSING, MEDICAL CARE, AND HEATING?: SOMEWHAT HARD
WITHIN THE LAST YEAR, HAVE TO BEEN RAPED OR FORCED TO HAVE ANY KIND OF SEXUAL ACTIVITY BY YOUR PARTNER OR EX-PARTNER?: NO
DO YOU BELONG TO ANY CLUBS OR ORGANIZATIONS SUCH AS CHURCH GROUPS UNIONS, FRATERNAL OR ATHLETIC GROUPS, OR SCHOOL GROUPS?: NO
WITHIN THE LAST YEAR, HAVE YOU BEEN AFRAID OF YOUR PARTNER OR EX-PARTNER?: NO
WITHIN THE LAST YEAR, HAVE YOU BEEN KICKED, HIT, SLAPPED, OR OTHERWISE PHYSICALLY HURT BY YOUR PARTNER OR EX-PARTNER?: NO

## 2024-08-25 ASSESSMENT — PATIENT HEALTH QUESTIONNAIRE - PHQ9
8. MOVING OR SPEAKING SO SLOWLY THAT OTHER PEOPLE COULD HAVE NOTICED. OR THE OPPOSITE, BEING SO FIGETY OR RESTLESS THAT YOU HAVE BEEN MOVING AROUND A LOT MORE THAN USUAL: SEVERAL DAYS
7. TROUBLE CONCENTRATING ON THINGS, SUCH AS READING THE NEWSPAPER OR WATCHING TELEVISION: SEVERAL DAYS
5. POOR APPETITE OR OVEREATING: SEVERAL DAYS
2. FEELING DOWN, DEPRESSED OR HOPELESS: NEARLY EVERY DAY
1. LITTLE INTEREST OR PLEASURE IN DOING THINGS: NEARLY EVERY DAY
SUM OF ALL RESPONSES TO PHQ QUESTIONS 1-9: 18
SUM OF ALL RESPONSES TO PHQ QUESTIONS 1-9: 21
3. TROUBLE FALLING OR STAYING ASLEEP: NEARLY EVERY DAY
10. IF YOU CHECKED OFF ANY PROBLEMS, HOW DIFFICULT HAVE THESE PROBLEMS MADE IT FOR YOU TO DO YOUR WORK, TAKE CARE OF THINGS AT HOME, OR GET ALONG WITH OTHER PEOPLE: EXTREMELY DIFFICULT
6. FEELING BAD ABOUT YOURSELF - OR THAT YOU ARE A FAILURE OR HAVE LET YOURSELF OR YOUR FAMILY DOWN: NEARLY EVERY DAY
SUM OF ALL RESPONSES TO PHQ QUESTIONS 1-9: 21
SUM OF ALL RESPONSES TO PHQ QUESTIONS 1-9: 21
SUM OF ALL RESPONSES TO PHQ9 QUESTIONS 1 & 2: 6
9. THOUGHTS THAT YOU WOULD BE BETTER OFF DEAD, OR OF HURTING YOURSELF: NEARLY EVERY DAY
4. FEELING TIRED OR HAVING LITTLE ENERGY: NEARLY EVERY DAY

## 2024-08-25 ASSESSMENT — LIFESTYLE VARIABLES
HOW OFTEN DO YOU HAVE A DRINK CONTAINING ALCOHOL: 4 OR MORE TIMES A WEEK
HOW OFTEN DO YOU HAVE A DRINK CONTAINING ALCOHOL: 4 OR MORE TIMES A WEEK
HOW MANY STANDARD DRINKS CONTAINING ALCOHOL DO YOU HAVE ON A TYPICAL DAY: 3 OR 4
HOW OFTEN DO YOU HAVE A DRINK CONTAINING ALCOHOL: 4 OR MORE TIMES A WEEK

## 2024-08-25 NOTE — PLAN OF CARE
Problem: Pain  Goal: Verbalizes/displays adequate comfort level or baseline comfort level  Outcome: Progressing     Problem: Anxiety  Goal: Will report anxiety at manageable levels  Description: INTERVENTIONS:  1. Administer medication as ordered  2. Teach and rehearse alternative coping skills  3. Provide emotional support with 1:1 interaction with staff  Outcome: Progressing     Problem: Coping  Goal: Pt/Family able to verbalize concerns and demonstrate effective coping strategies  Description: INTERVENTIONS:  1. Assist patient/family to identify coping skills, available support systems and cultural and spiritual values  2. Provide emotional support, including active listening and acknowledgement of concerns of patient and caregivers  3. Reduce environmental stimuli, as able  4. Instruct patient/family in relaxation techniques, as appropriate  5. Assess for spiritual pain/suffering and initiate Spiritual Care, Psychosocial Clinical Specialist consults as needed  Outcome: Progressing     Problem: Decision Making  Goal: Pt/Family able to effectively weigh alternatives and participate in decision making related to treatment and care  Description: INTERVENTIONS:  1. Determine when there are differences between patient's view, family's view, and healthcare provider's view of condition  2. Facilitate patient and family articulation of goals for care  3. Help patient and family identify pros/cons of alternative solutions  4. Provide information as requested by patient/family  5. Respect patient/family right to receive or not to receive information  6. Serve as a liaison between patient and family and health care team  7. Initiate Consults from Ethics, Palliative Care or initiate Family Care Conference as is appropriate  Outcome: Progressing     Problem: Behavior  Goal: Pt/Family maintain appropriate behavior and adhere to behavioral management agreement, if implemented  Description: INTERVENTIONS:  1. Assess

## 2024-08-25 NOTE — H&P
Department of Psychiatry  Attending History and Physical - Adult         CHIEF COMPLAINT: Suicidal ideations    History obtained from:  patient    HISTORY OF PRESENT ILLNESS:          The patient is a 24 y.o. adult with previous psychiatric history of depression, anxiety, ADHD, unspecified psychosis, who has been admitted to our psychiatric unit secondary to treatment noncompliance, alcohol intoxication and suicidal ideations.  Patient's blood alcohol level in ER was 47.  His UDS was positive for cannabinoids.    Patient is well-known to psychiatry due to previous admission to our psychiatric unit in February 2024.    For initial psychiatric evaluation, please, refer to the note of psychiatry [facility] nurse  Mohsen Milner RN, as reflected below:  \"PT states reason for ED visit, \" I have been feeling worse lately. I do not feel safe alone either. I don't really feel safe anywhere right now\"     Willem Ndiaye 25 YO with a history of anxiety, depression and ADHD, psychosis, cannabis use disorder and insomnia. PT has history of admission to this facility's mental health unit most recent January 2024.The pt is observed in ED room 11, 1:1 sitter is initiated and at bedside. The pt is observed laying in ED room bed in safety scrubs. The pt appears calm but appears dysthymic. The pt reports to this nurse he has been having suicidal thoughts for 1 week started mild and controllable and the pt reports tonight he has had suicidal thoughts with a plan to cut himself with a knife, the pt has a history of cutting, many scares on upper and lower arms, some cuts look newer with scabs on them. PT does report new cuts to thighs. The pt was brought in to the ED by huis father who is at bedside. The pt reports he started to feel unsafe tonight and had plans to write suicide letters. The pt informs this nurse he received a DUI a few weeks ago, Sept 3, 2024 court date, also the pt reports on his birthday, 8/15/2024, him and his  Vitamins-Minerals (THERAPEUTIC MULTIVITAMIN-MINERALS) tablet, Take 1 tablet by mouth daily  B-D 3CC LUER-IRMA SYR 22GX1\" 22G X 1\" 3 ML MISC,   Syringe, Disposable, 3 ML MISC, Use as directed for monthly testosterone injections.  Please dispense with 1 inch needle  testosterone cypionate (DEPOTESTOTERONE CYPIONATE) 200 MG/ML injection, Inject 0.5 mLs into the muscle every 14 days.    Allergies:  Prozac [fluoxetine], Zoloft [sertraline hcl], Pristiq [desvenlafaxine], Milk-related compounds, and Wheat dextrin    Social History:  Patient is single, currently is in the relationship and has a girlfriend.  He works as a  in restaurant.    Smoking-5 cigarettes a day  Alcohol-reported that drinks a few shots of liquor every evening, daily.  Illicit substances-reported history of heavily smoking cannabis in the past since age 20 years old, stated that most recently was smoking cannabis \"every other day, 1-2 times a day.    Legal issues-reported that a few weeks ago was arrested and charged with DUI after driving his car under the influence of alcohol.    Family History:       Problem Relation Age of Onset    No Known Problems Mother     Hypertension Father     Other Sister         Gender Dysphoria     Psychiatric Family History  Patient reported that he is mother has been diagnosed with depression, anxiety and ADHD.  Patient stated that his father was abusing alcohol.  His sister has been suffering from mental health illnesses, however, patient does not know her diagnosis, stated that she was admitted to psychiatric facilities before.  There is no family history of attempted or completed suicide.    REVIEW OF SYSTEMS:  General: Endorses feeling of depression and anxiety no fevers, chills, night sweats, no recent weight loss or gain.  Head: No headache, no migraine.  Eyes: No recent visual changes.  Ears: No recent hearing changes.  Nose: No increased congestion or change in sense of smell.  Throat: No sore throat, no
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
## 2024-08-25 NOTE — PROGRESS NOTES
[facility] Adult Unit Daily Assessment  Nursing Progress Note    Room: [facility]625A-   Name: Willem Ndiaye   Age: 24 y.o.   Gender: adult   Dx: Depression with suicidal ideation  Precautions: suicide risk  Inpatient Status: voluntary     SLEEP:  Sleep Quality Good  Sleep Medications: Yes   PRN Sleep Meds: Yes     MEDICAL:  Other PRN Meds: Yes   Med Compliant: Yes  Accu-Chek: No  Oxygen/CPAP/BiPAP: No  CIWA/CINA: No   PAIN Assessment: none  Side Effects from medication: No    Metabolic Screening:  Lab Results   Component Value Date    LABA1C 5.4 01/31/2024     Lab Results   Component Value Date    CHOL 115 (L) 01/31/2024    CHOL 122 10/02/2014     Lab Results   Component Value Date    TRIG 69 01/31/2024    TRIG 113 10/02/2014     Lab Results   Component Value Date    HDL 42 (L) 01/31/2024    HDL 41 10/02/2014     No components found for: \"LDLCAL\"  No components found for: \"LABVLDL\"  Body mass index is 25.79 kg/m².  BP Readings from Last 2 Encounters:   08/25/24 128/88   05/16/24 136/84       Medical Bed:   Is patient in a medical bed? no   If medical bed is in use, has nursing secured room while patient is awake and out of the room? NA  Has safety checks by nursing been completed on the bed/room this shift? NA    Protective Factors:  Patient identifies protective factors with nursing staff as follows:   Identifies reasons for living: Yes   Supportive Social Network or family: Yes    Belief that suicide is immoral/high spirituality: Yes   Responsibility to family or others/living with family: No   Fear of death or dying due to pain and suffering: Yes   Engaged in work or school: Yes  If Patient is unable to identify, reason why?      Depression: 8   Anxiety: 6   SI denies suicidal ideation   Risk of Suicide: No Risk  HI Negative for homicidal ideation        AVH:no If Hallucinations are present, describe?      Appetite: good   Percent Meals: 100%   Social: Yes   Speech: normal   Appearance: appropriately dressed,  appropriately groomed, and healthy looking    GROUP:  Group Participation: Yes  Participation Quality: Interactive    Notes:     Pt states that he slept well.  he has a good appetite.  he is social with peers and staff.  he attends groups.  She is compliant with medications.  he states he has thoughts of cutting himself but contracts for safety.  She denies HI.  She denies AVH    Electronically signed by Hailey Hernandez RN on 8/25/24 at 3:05 PM CDT

## 2024-08-25 NOTE — PROGRESS NOTES
Yes   Where & When: [facility]  1/2024  Outpatient Psychiatric Treatment:[facility], [facility]    Family History:    Family history of mental illness: yes Mother:depression and anxiety, sister:depression,anxiety, ADHD, paranoia  \"Depression\",\"Anxiety\",\"Bipolar\",\"Schizophrenia\",\"Borderline\",\"ADHD\"}  Family members with suicide attempt: no   If yes explain (attempted or completed):    Substance Abuse History:     SBIRT Completed: yes  Brief Intervention completed if needed:  (Yes/No)    Current ETOH LEVELS: 47    ETOH Usage:     Amount drinking daily: moderate -reports drinking daily which is an increase per PT.  Date of last drink: 8/24/2024  Longest period of sobriety:several days    Substance/Chemical Abuse/Recreational Drug History:  Substance used: alcohol and marijuana  Date of last substance use: 8/24/2024  Tobacco Use: yes   History of rehab treatment:no  How many times in rehab:n/a  Last time in rehab:  Family history of substance abuse:father-etoh    Opiates: It was discussed with pt they would not be receiving opiates unless they were within 3 days post surgery/acute injury. Patient voiced understanding and agreed.        Medical History:     Medical Diagnosis/Issues: female to male transition  CT today in ED:no  Use of 02 or CPAP: no  Ambulatory: yes  Independent or Need assistance with Self Care:     PCP: Miracle Escobar MD     Current Medications:   Scheduled Meds: No current facility-administered medications for this encounter.    Current Outpatient Medications:     lamoTRIgine (LAMICTAL) 100 MG tablet, Take 1 tablet by mouth daily, Disp: , Rfl:     Lumateperone Tosylate (CAPLYTA) 42 MG capsule, Take 1 capsule by mouth, Disp: , Rfl:     amphetamine-dextroamphetamine (ADDERALL XR) 30 MG extended release capsule, Take one capsule daily, Disp: 90 capsule, Rfl: 0    OLANZapine (ZYPREXA) 7.5 MG tablet, Take 1 tablet by mouth nightly (Patient not taking:  Barriers of the safety plan with patient: N/A - Patient admitted to Behavioral Health Unit        Mohsen Milner RN

## 2024-08-25 NOTE — H&P
(DEPOTESTOTERONE CYPIONATE) 200 MG/ML injection Inject 0.5 mLs into the muscle every 14 days. 4/25/18 5/16/24  Provider, MD Gigi         OBJECTIVE:    Vitals:    08/25/24 0122   BP: 126/85   Pulse: 80   Resp: 16   Temp: 97.9 °F (36.6 °C)   SpO2: 97%   breathing on Room Air    /85   Pulse 80   Temp 97.9 °F (36.6 °C) (Temporal)   Resp 16   Wt 70.3 kg (155 lb)   LMP  (Approximate)   SpO2 97%   BMI 25.79 kg/m²     No intake or output data in the 24 hours ending 08/25/24 0228    Physical Exam:  VS as per above  GA: Alert, NAD  Head: NC, AT  Neck: Supple, Trachea appears midline  PUL: CTA anterolaterally, No W/R/R nor rubs  COR: RRR, No M/R/G  ABD: Normal Bowel Sounds, No G/R/T, slight abdominal obesity  MSK: no wasting of fat or muscle stores, no pretibial edema  Skin: Warm, nondiaphoretic  PSY: appropriate affect, answers questions appropriately    LABORATORY DATA:    CBC:   Recent Labs     08/24/24  2212   WBC 9.1   HGB 13.0   HCT 39.5        BMP:   Recent Labs     08/24/24  2212      K 3.8      CO2 22   BUN 16   CREATININE 0.7   CALCIUM 9.2     Hepatic Profile:   Recent Labs     08/24/24  2212   AST 28   ALT 20   BILITOT 0.5   ALKPHOS 87     EKG:   Not indicated.    IMAGING:  No results found.        ASESSMENTS & PLANS:    Patient Active Problem List   Diagnosis    Major depressive disorder    Hx of self mutilation    Female-to-male transgender person    Attention deficit disorder    Endocrine disorder    Gender dysphoria in adolescent and adult    Generalized anxiety disorder    Suicidal ideation    Tachycardia    Transsexualism    Psychosis, unspecified psychosis type (HCC)    Cannabis use disorder    Insomnia    Depression with suicidal ideation     Depression with Suicidal Ideation: - as per Psychiatric Service's Guidance  Admitted to Psychiatric Inpatient Unit by Psychiatry  Group Therapy & Psychotropic Therapy as per Psychiatry     Tobacco Abuse of 1/2 PPD:  14mcg patch

## 2024-08-25 NOTE — ED NOTES
Pt changed into maroon scrubs, belongings removed from room; ligature risks removed from room, cabinets locked. Security notified. Sitter initiated.

## 2024-08-25 NOTE — ED PROVIDER NOTES
[facility] 6 ADULT [facility]  eMERGENCY dEPARTMENT eNCOUnter      Pt Name: Willem Ndiaye  MRN: [id number]  Birthdate 2000  Date of evaluation: 8/24/2024  Provider: Shanice Estrella MD    CHIEF COMPLAINT       Chief Complaint   Patient presents with    Suicidal     Patient arrive stating he has been feeling suicidal for about a week.  He states this evening he began thinking about writing letters.  Plan to use a knife          HISTORY OF PRESENT ILLNESS   (Location/Symptom, Timing/Onset,Context/Setting, Quality, Duration, Modifying Factors, Severity)  Note limiting factors.   Willem Ndiaye is a 24 y.o. adult who presents to the emergency department with suicidal thoughts.  When asked how long patient has had suicidal thoughts patient states \"it got bad about a week ago.\"  Patient states \"I always have a struggle with depression.\"  Patient denies homicidal ideation, auditory hallucination or visual hallucinations.  Patient states that he uses THC and tobacco.  Patient states he drinks daily.  Patient denies history of alcohol withdrawal.  Patient states that he sees Dr. Lyn at [facility].    [facility]    NursingNotes were reviewed.    REVIEW OF SYSTEMS    (2-9 systems for level 4, 10 or more for level 5)     Review of Systems   Psychiatric/Behavioral:  Positive for suicidal ideas.    All other systems reviewed and are negative.           PAST MEDICALHISTORY     Past Medical History:   Diagnosis Date    ADHD (attention deficit hyperactivity disorder)     Anxiety     Depression     Personality disorder, depressive     Self mutilating behavior          SURGICAL HISTORY       Past Surgical History:   Procedure Laterality Date    MASTECTOMY, BILATERAL Bilateral 08/02/2022         CURRENT MEDICATIONS     Current Discharge Medication List        CONTINUE these medications which have NOT CHANGED    Details   lamoTRIgine (LAMICTAL) 100 MG tablet Take 1 tablet by mouth daily      Lumateperone Tosylate (CAPLYTA) 42 MG

## 2024-08-25 NOTE — ED NOTES
ED TO INPATIENT SBAR HANDOFF    Patient Name: Willem Ndiaye   : 2000  24 y.o.   Family/Caregiver Present: Yes, while in the ER  Code Status Order: Prior    C-SSRS: Risk of Suicide: High Risk  Sitter Yes, while in the ER  Restraints:   N      Situation  Chief Complaint:   Chief Complaint   Patient presents with    Suicidal     Patient arrive stating he has been feeling suicidal for about a week.  He states this evening he began thinking about writing letters.  Plan to use a knife      Patient Diagnosis: No admission diagnoses are documented for this encounter.     Brief Description of Patient's Condition: Patient arrive stating he has been feeling suicidal for about a week.  He states this evening he began thinking about writing letters.  Plan to use a knife   Mental Status: oriented, alert, coherent, logical, and thought processes intact  Arrived from: home    Imaging:   No orders to display     COVID-19 Results:   Internal Administration   First Dose COVID-19, MODERNA BLUE border, Primary or Immunocompromised, (age 12y+), IM, 100 mcg/0.5mL  2021   Second Dose COVID-19, MODERNA BLUE border, Primary or Immunocompromised, (age 12y+), IM, 100 mcg/0.5mL   04/15/2021       Last COVID Lab SARS-CoV-2, NAAT (no units)   Date Value   2024 Not Detected           Abnormal labs:   Abnormal Labs Reviewed   CBC WITH AUTO DIFFERENTIAL - Abnormal; Notable for the following components:       Result Value    MCHC 32.9 (*)     MPV 8.5 (*)     Neutrophils % 69.1 (*)     Lymphocytes % 19.8 (*)     Monocytes % 10.7 (*)     Monocytes Absolute 1.00 (*)     All other components within normal limits   DRUG SCRN, BUPRENORPHINE - Abnormal; Notable for the following components:    Cannabinoid Scrn, Ur POSITIVE (*)     All other components within normal limits     Background  Allergies:   Allergies   Allergen Reactions    Prozac [Fluoxetine] Other (See Comments)     hallucinations    Milk-Related Compounds     Pristiq

## 2024-08-25 NOTE — PROGRESS NOTES
Behavioral Services  Medicare Certification Upon Admission    I certify that this patient's inpatient psychiatric hospital admission is medically necessary for:    [x] (1) Treatment which could reasonably be expected to improve this patient's condition,       [x] (2) Or for diagnostic study;     AND     [x](2) The inpatient psychiatric services are provided while the individual is under the care of a physician and are included in the individualized plan of care.    Estimated length of stay/service 3-5 days    Plan for post-hospital care TBD    Electronically signed by SAI MARCUM MD on 8/25/2024 at 9:13 AM

## 2024-08-26 PROBLEM — F60.3 BORDERLINE PERSONALITY DISORDER (HCC): Status: ACTIVE | Noted: 2024-08-26

## 2024-08-26 PROCEDURE — 1240000000 HC EMOTIONAL WELLNESS R&B

## 2024-08-26 PROCEDURE — 6370000000 HC RX 637 (ALT 250 FOR IP): Performed by: PSYCHIATRY & NEUROLOGY

## 2024-08-26 PROCEDURE — 6370000000 HC RX 637 (ALT 250 FOR IP): Performed by: HOSPITALIST

## 2024-08-26 PROCEDURE — 99232 SBSQ HOSP IP/OBS MODERATE 35: CPT | Performed by: NURSE PRACTITIONER

## 2024-08-26 RX ADMIN — BUPROPION HYDROCHLORIDE 150 MG: 150 TABLET, EXTENDED RELEASE ORAL at 08:36

## 2024-08-26 RX ADMIN — TRAZODONE HYDROCHLORIDE 50 MG: 50 TABLET ORAL at 20:41

## 2024-08-26 RX ADMIN — Medication 1 TABLET: at 08:36

## 2024-08-26 RX ADMIN — Medication 5 MG: at 20:41

## 2024-08-26 RX ADMIN — HYDROXYZINE HYDROCHLORIDE 25 MG: 25 TABLET ORAL at 20:41

## 2024-08-26 NOTE — PLAN OF CARE
Problem: Coping  Goal: Pt/Family able to verbalize concerns and demonstrate effective coping strategies  8/26/2024 1136 by Lauren Cam  Outcome: Progressing    Group Therapy Note     Date: 8/26/2024  Start Time: 1100  End Time:  1130  Number of Participants: 11     Type of Group: Psychotherapy     Wellness Binder Information  Module Name:  emotional wellness  Session Number:  5     Patient's Goal:  obstacles to emotional wellness     Notes:  pt acknowledged negative thinking as an obstacle to emotional wellness.     Status After Intervention:  Improved     Participation Level: Interactive     Participation Quality: Appropriate, Attentive, and Sharing        Speech:  normal        Thought Process/Content: Logical        Affective Functioning: Congruent        Mood: congruent        Level of consciousness:  Alert, Oriented x4, and Attentive        Response to Learning: Able to verbalize current knowledge/experience        Endings: None Reported     Modes of Intervention: Education        Discipline Responsible: Psychoeducational Specialist        Signature:  Lauren Cam

## 2024-08-26 NOTE — PSYCHOTHERAPY
Group Therapy Note    Date: 8/26/2024  Start Time: 1:30  End Time:  2:00  Number of Participants: 8    Type of Group: Spirituality.    Wellness Binder Information  Module Name:  Isolation and Care Mechanism.  Session Number:      Patient's Goal:  Identification and Recovery.    Notes:      Status After Intervention:  Improved    Participation Level: Interactive    Participation Quality: Appropriate, Attentive, Sharing.      Speech:  normal      Thought Process/Content: Linear      Affective Functioning: Congruent      Mood: anxious and fearful      Level of consciousness:  Alert      Response to Learning: Able to verbalize current knowledge/experience      Endings:     Modes of Intervention: Education, Support, and Socialization      Discipline Responsible: .      Signature:  Akhil Myers M.Div. M.ThDerrick

## 2024-08-26 NOTE — PROGRESS NOTES
Group Note    Date: 08/26/24  Start Time: 8:00 AM   End Time:9:00 AM     Number of Participants: 12    Type of Group: Community/Goal     Patient's Goal:  \"Coping skills ( drawing, puzzles, etc)\"    Notes:      Status After Intervention:      Participation Level: Active Listener    Participation Quality: Appropriate    Speech:  normal    Thought Process/Content: Logical    Mood:  Calm    Level of consciousness:  Alert    Response to Learning: Able to verbalize current knowledge/experience    Modes of Intervention: Education and Support    Discipline Responsible: Behavioral Health Technician     Signature:  SHREE WEINSTEIN

## 2024-08-26 NOTE — PROGRESS NOTES
Group Note    Date: 08/26/24  Start Time: 6:45 PM   End Time:7:15 PM     Number of Participants: 12    Type of Group: Wrap-Up     Patient's Goal:      Notes:      Status After Intervention:  Improved    Participation Level: Minimal    Participation Quality: Appropriate    Speech:  normal    Thought Process/Content: Logical    Mood: anxious and depressed    Level of consciousness:  Alert and Oriented x4    Response to Learning: Able to verbalize current knowledge/experience    Modes of Intervention: Education and Support    Discipline Responsible: Registered Nurse     Signature:  TREVOR DELVALLE RN
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
## 2024-08-26 NOTE — PLAN OF CARE
Problem: Coping  Goal: Pt/Family able to verbalize concerns and demonstrate effective coping strategies  Description: INTERVENTIONS:  1. Assist patient/family to identify coping skills, available support systems and cultural and spiritual values  2. Provide emotional support, including active listening and acknowledgement of concerns of patient and caregivers  3. Reduce environmental stimuli, as able  4. Instruct patient/family in relaxation techniques, as appropriate  5. Assess for spiritual pain/suffering and initiate Spiritual Care, Psychosocial Clinical Specialist consults as needed  Outcome: Progressing  Note:                                                                     Group Therapy Note    Date: 8/26/2024  Start Time: 1000  End Time:  1030  Number of Participants: 13    Type of Group: Psychoeducation    Wellness Binder Information  Module Name:  Relapse Prevention  Session Number:  5    Group Goal for Pt:  To improve knowledge of relapse prevention strategies    Notes:  Pt demonstrated improved knowledge of relapse prevention strategies by actively participating in group discussion.    Status After Intervention:  Unchanged    Participation Level: Active Listener    Participation Quality: Appropriate      Speech:  normal      Thought Process/Content: Logical      Affective Functioning: Congruent      Mood: anxious and depressed      Level of consciousness:  Alert and Oriented x4      Response to Learning: Able to verbalize current knowledge/experience, Able to verbalize/acknowledge new learning, and Progressing to goal      Endings: None Reported    Modes of Intervention: Education      Discipline Responsible: Psychoeducational Specialist      Signature:  Elsa Sarabia

## 2024-08-26 NOTE — PROGRESS NOTES
[facility] Adult Unit Daily Assessment  Nursing Progress Note    Room: [facility]625A-   Name: Willem Ndiaye   Age: 24 y.o.   Gender: adult   Dx: Depression with suicidal ideation  Precautions: suicide risk  Inpatient Status: voluntary     SLEEP:  Sleep Quality Good  Sleep Medications: no   PRN Sleep Meds: Yes trazodone 50 mg melatonin 5 mg     MEDICAL:  Other PRN Meds: Yes atarax 25 mg  Med Compliant: Yes  Accu-Chek: No  Oxygen/CPAP/BiPAP: No  CIWA/CINA: No   PAIN Assessment: none  Side Effects from medication: No    Metabolic Screening:  Lab Results   Component Value Date    LABA1C 5.4 01/31/2024     Lab Results   Component Value Date    CHOL 115 (L) 01/31/2024    CHOL 122 10/02/2014     Lab Results   Component Value Date    TRIG 69 01/31/2024    TRIG 113 10/02/2014     Lab Results   Component Value Date    HDL 42 (L) 01/31/2024    HDL 41 10/02/2014     No components found for: \"LDLCAL\"  No components found for: \"LABVLDL\"  Body mass index is 25.79 kg/m².  BP Readings from Last 2 Encounters:   08/25/24 131/84   05/16/24 136/84       Medical Bed:   Is patient in a medical bed? no   If medical bed is in use, has nursing secured room while patient is awake and out of the room? NA  Has safety checks by nursing been completed on the bed/room this shift? yes    Protective Factors:  Patient identifies protective factors with nursing staff as follows:   Identifies reasons for living: Yes   Supportive Social Network or family: Yes    Belief that suicide is immoral/high spirituality: Yes   Responsibility to family or others/living with family: Yes   Fear of death or dying due to pain and suffering: Yes   Engaged in work or school: Yes  If Patient is unable to identify, reason why?     Depression: 8   Anxiety: 4   SI denies suicidal ideation   Risk of Suicide: No Risk  HI Negative for homicidal ideation        AVH:no If Hallucinations are present, describe?     Appetite: good   Percent Meals: 75%   Social: No   Speech: normal

## 2024-08-26 NOTE — PROGRESS NOTES
SW met with patient to complete psychosocial and lifetime CSSR-S on this date. Patients long and short-term goals discussed. Patient voiced understanding. Treatment plan sheet signed. Patient verbalized understanding of the treatment plan. Patient participated in goals and objectives of the treatment plan. Patient discussed safety plan with . Discussed use of positive coping skills to reduce depression and anxiety.    In the last 6 months has the patient been a danger to self: Yes  In the last 6 months has the patient been a danger to others: No  Legal Guardian/POA: No    Activity Assessment:  Skills:  Talents:  Interests:mother    Provided patient with [employer] handout entitled \"Quitting Smoking.\"  Reviewed handout with patient: addressing dangers of smoking, developing coping skills, and providing basic information about quitting.       Patient received all components practical counseling of tobacco practical counseling during the hospital stay.

## 2024-08-26 NOTE — PROGRESS NOTES
[facility]      Psychiatric Progress Note    Name:  Willem Ndiaye  Date:  8/26/2024  Age:  24 y.o.  Sex:  adult  Ethnicity:   Primary Care Physician:  Miracle Escobar MD   Patient Care Team:  Patient Care Team:  Miracle Escobar MD as PCP - General (Family Medicine)  Miracle Escobar MD as PCP - Empaneled Provider  Chief Complaint: \" I am still feeling depressed.\"        Historian:patient  Complaint Type: anxiety, depression, loss of interest in favorite activities, sleep disturbance, and tobacco use  Course of Symptoms: ongoing  Precipitating Factors: history of mental illness, recent legal issues       Subjective  Nursing notes were reviewed and patient had no behavioral issues during the night.  As needed medications administered during the night include Atarax, trazodone and melatonin. Today he endorses passive suicidal. He denies homicidal ideations and psychosis. He states, \"I just feel that people would be better off if I weren't around.\" Wishes he didn't have to be alive. Reports he is \"tired of being sad.\" He was arrested recently for DUI and has an upcoming court date next week. He continues to use cannabinoids even though his last admission he was educated on the adverse effects of using cannabinoids.  Patient reports sleep as, \"I had a hard time falling asleep, but once I got to sleep, I slept all night.\" Patient has been calm and cooperative with staff and peers. Patient has been compliant with medications. Patient has been attending group therapy. Patient reports appetite as, \"I am eating normally.\" Patient reports no side effects from medications.      Objective  Vital Signs:  Last set of tests and vitals:  Vitals:    08/26/24 0835   BP: (!) 107/58   Pulse: 75   Resp: 16   Temp: 97.9 °F (36.6 °C)   SpO2: 97%       Previous Psychiatric/Substance Use History      Medical History:  Past Medical History:   Diagnosis Date    ADHD (attention

## 2024-08-26 NOTE — PROGRESS NOTES
[facility] Adult Unit Daily Assessment  Nursing Progress Note    Room: [facility]625A-   Name: Willem Ndiaye   Age: 24 y.o.   Gender: adult   Dx: Depression with suicidal ideation  Precautions: close watch and suicide risk  Inpatient Status: voluntary     SLEEP:  Sleep Quality Fair  Sleep Medications: Yes   PRN Sleep Meds: Yes     MEDICAL:  Other PRN Meds: No   Med Compliant: Yes  Accu-Chek: No  Oxygen/CPAP/BiPAP: No  CIWA/CINA: No   PAIN Assessment: none  Side Effects from medication: No    Metabolic Screening:  Lab Results   Component Value Date    LABA1C 5.4 01/31/2024     Lab Results   Component Value Date    CHOL 115 (L) 01/31/2024    CHOL 122 10/02/2014     Lab Results   Component Value Date    TRIG 69 01/31/2024    TRIG 113 10/02/2014     Lab Results   Component Value Date    HDL 42 (L) 01/31/2024    HDL 41 10/02/2014     No components found for: \"LDLCAL\"  No components found for: \"LABVLDL\"  Body mass index is 25.79 kg/m².  BP Readings from Last 2 Encounters:   08/26/24 (!) 107/58   05/16/24 136/84       Medical Bed:   Is patient in a medical bed? no   If medical bed is in use, has nursing secured room while patient is awake and out of the room? NA  Has safety checks by nursing been completed on the bed/room this shift? yes    Protective Factors:  Patient identifies protective factors with nursing staff as follows:   Identifies reasons for living: Yes   Supportive Social Network or family: Yes    Belief that suicide is immoral/high spirituality: Yes   Responsibility to family or others/living with family: Yes   Fear of death or dying due to pain and suffering: Yes   Engaged in work or school: Yes  If Patient is unable to identify, reason why?     Depression: 7   Anxiety: 4   SI passive, no plan, contracts for safety  Risk of Suicide: No Risk  HI Negative for homicidal ideation        AVH:no If Hallucinations are present, describe?     Appetite: good   Percent Meals: 75%   Social: No   Speech: normal   Appearance:

## 2024-08-26 NOTE — PLAN OF CARE
of the patient, staff, or others refer to organization policy. If a visitor’s behavior poses a threat to safety refer to organization policy   Initiate consult with , Psychosocial Clinical Nurse Specialist, Spiritual Care as appropriate     Problem: Depression/Self Harm  Goal: Effect of psychiatric condition will be minimized and patient will be protected from self harm  Description: INTERVENTIONS:  1. Assess impact of patient's symptoms on level of functioning, self care needs and offer support as indicated  2. Assess patient/family knowledge of depression, impact on illness and need for teaching  3. Provide emotional support, presence and reassurance  4. Assess for possible suicidal thoughts or ideation. If patient expresses suicidal thoughts or statements do not leave alone, initiate Suicide Precautions, move to a room close to the nursing station and obtain sitter  5. Initiate consults as appropriate with Mental Health Professional, Spiritual Care, Psychosocial CNS, and consider a recommendation to the LIP for a Psychiatric Consultation  Outcome: Progressing  Flowsheets  Taken 8/26/2024 1815  Effect of psychiatric condition will be minimized and patient will be protected from self harm:   Assess impact of patient’s symptoms on level of functioning, self care needs and offer support as indicated   Assess patient/family knowledge of depression, impact on illness and need for teaching   Provide emotional support, presence and reassurance   Assess for suicidal thoughts or ideation. If patient expresses suicidal thoughts or statements do not leave alone, initiate Suicide Precautions, move near nurse station, obtain sitter   Initiate consults as appropriate with Mental Health Professional, Spiritual Care, Psychosocial CNS, and consider a recommendation to the LIP for a Psychiatric Consultation  Taken 8/26/2024 1755  Effect of psychiatric condition will be minimized and patient will be protected from self  harm:   Assess impact of patient’s symptoms on level of functioning, self care needs and offer support as indicated   Assess patient/family knowledge of depression, impact on illness and need for teaching   Provide emotional support, presence and reassurance   Assess for suicidal thoughts or ideation. If patient expresses suicidal thoughts or statements do not leave alone, initiate Suicide Precautions, move near nurse station, obtain sitter   Initiate consults as appropriate with Mental Health Professional, Spiritual Care, Psychosocial CNS, and consider a recommendation to the LIP for a Psychiatric Consultation     Problem: Self Harm/Suicidality  Goal: Will have no self-injury during hospital stay  Description: INTERVENTIONS:  1.  Ensure constant observer at bedside with Q15M safety checks  2.  Maintain a safe environment  3.  Secure patient belongings  4.  Ensure family/visitors adhere to safety recommendations  5.  Ensure safety tray has been added to patient's diet order  6.  Every shift and PRN: Re-assess suicidal risk via Frequent Screener    Outcome: Progressing  Flowsheets (Taken 8/26/2024 9725)  Will have no self-injury during hospital stay:   Ensure constant observer at bedside with Q15M safety checks   Ensure safety tray has been added to patient's diet order   Maintain a safe environment   Every shift and PRN: Re-assess suicidal risk via Frequent Screener   Secure patient belongings   Ensure family/visitors adhere to safety recommendations     Problem: Safety - Adult  Goal: Free from fall injury  Outcome: Progressing

## 2024-08-27 LAB
25(OH)D3 SERPL-MCNC: 24.5 NG/ML
TSH SERPL DL<=0.005 MIU/L-ACNC: 1.79 UIU/ML (ref 0.27–4.2)
VIT B12 SERPL-MCNC: 419 PG/ML (ref 211–946)

## 2024-08-27 PROCEDURE — 1240000000 HC EMOTIONAL WELLNESS R&B

## 2024-08-27 PROCEDURE — 6370000000 HC RX 637 (ALT 250 FOR IP): Performed by: HOSPITALIST

## 2024-08-27 PROCEDURE — 82306 VITAMIN D 25 HYDROXY: CPT

## 2024-08-27 PROCEDURE — 6370000000 HC RX 637 (ALT 250 FOR IP): Performed by: PSYCHIATRY & NEUROLOGY

## 2024-08-27 PROCEDURE — 36415 COLL VENOUS BLD VENIPUNCTURE: CPT

## 2024-08-27 PROCEDURE — 6370000000 HC RX 637 (ALT 250 FOR IP): Performed by: NURSE PRACTITIONER

## 2024-08-27 PROCEDURE — 82607 VITAMIN B-12: CPT

## 2024-08-27 PROCEDURE — 6370000000 HC RX 637 (ALT 250 FOR IP): Performed by: FAMILY MEDICINE

## 2024-08-27 PROCEDURE — 99232 SBSQ HOSP IP/OBS MODERATE 35: CPT | Performed by: NURSE PRACTITIONER

## 2024-08-27 PROCEDURE — 84443 ASSAY THYROID STIM HORMONE: CPT

## 2024-08-27 RX ORDER — LAMOTRIGINE 100 MG/1
100 TABLET ORAL DAILY
Status: DISCONTINUED | OUTPATIENT
Start: 2024-08-27 | End: 2024-08-27

## 2024-08-27 RX ORDER — LAMOTRIGINE 100 MG/1
100 TABLET ORAL DAILY
Status: DISCONTINUED | OUTPATIENT
Start: 2024-08-27 | End: 2024-08-28 | Stop reason: HOSPADM

## 2024-08-27 RX ORDER — ERGOCALCIFEROL 1.25 MG/1
50000 CAPSULE, LIQUID FILLED ORAL WEEKLY
Status: DISCONTINUED | OUTPATIENT
Start: 2024-08-27 | End: 2024-08-28 | Stop reason: HOSPADM

## 2024-08-27 RX ADMIN — Medication 5 MG: at 21:08

## 2024-08-27 RX ADMIN — TRAZODONE HYDROCHLORIDE 50 MG: 50 TABLET ORAL at 21:08

## 2024-08-27 RX ADMIN — HYDROXYZINE HYDROCHLORIDE 25 MG: 25 TABLET ORAL at 21:08

## 2024-08-27 RX ADMIN — HYDROXYZINE HYDROCHLORIDE 25 MG: 25 TABLET ORAL at 09:11

## 2024-08-27 RX ADMIN — ERGOCALCIFEROL 50000 UNITS: 1.25 CAPSULE ORAL at 13:46

## 2024-08-27 RX ADMIN — Medication 1 TABLET: at 07:36

## 2024-08-27 RX ADMIN — BUPROPION HYDROCHLORIDE 150 MG: 150 TABLET, EXTENDED RELEASE ORAL at 07:38

## 2024-08-27 RX ADMIN — LAMOTRIGINE 100 MG: 100 TABLET ORAL at 15:36

## 2024-08-27 NOTE — PROGRESS NOTES
Progress Note  Willem Ndiaye  8/26/2024 11:20 PM  Subjective:   Admit Date:   8/24/2024      CC/ADMIT DX:       Interval History:   Reviewed overnight events and nursing notes.  He has had no new medical issues.     I have reviewed all labs/diagnostics from the last 24hrs.       ROS:   I have done a 10 point ROS and all are negative, except what is mentioned in the HPI.    ADULT DIET; Regular; Safety Tray; Safety Tray (Disposables)    Medications:      nicotine  1 patch TransDERmal Daily    therapeutic multivitamin-minerals  1 tablet Oral Daily    Lumateperone Tosylate  42 mg Oral Daily    buPROPion  150 mg Oral Daily           Objective:   Vitals: /86   Pulse 84   Temp 98.4 °F (36.9 °C) (Temporal)   Resp 18   Wt 70.3 kg (155 lb)   LMP  (Approximate)   SpO2 100%   BMI 25.79 kg/m²  No intake or output data in the 24 hours ending 08/26/24 2320  General appearance: alert and cooperative with exam  Extremities: extremities normal, atraumatic, no cyanosis or edema  Neurologic:  No obvious focal neurologic deficits.    Assessment and Plan:   Principal Problem:    Depression with suicidal ideation  Active Problems:    Unspecified mood (affective) disorder (HCC)    Borderline personality disorder (HCC)  Resolved Problems:    * No resolved hospital problems. *      Plan:   Continue present medication(s)    Follow with Psych   Additional labs ordered      Discharge planning:    home     Reviewed treatment plans with the patient and/or family.             Electronically signed by Az Kraus MD on 8/26/2024 at 11:20 PM

## 2024-08-27 NOTE — PROGRESS NOTES
Progress Note  Willem Ndiaye  8/27/2024 1:59 PM  Subjective:   Admit Date:   8/24/2024      CC/ADMIT DX:       Interval History:   Reviewed overnight events and nursing notes.  He denies any new physical complaints.     I have reviewed all labs/diagnostics from the last 24hrs.       ROS:   I have done a 10 point ROS and all are negative, except what is mentioned in the HPI.    ADULT DIET; Regular; Safety Tray; Safety Tray (Disposables)    Medications:      vitamin D  50,000 Units Oral Weekly    nicotine  1 patch TransDERmal Daily    therapeutic multivitamin-minerals  1 tablet Oral Daily    Lumateperone Tosylate  42 mg Oral Daily    buPROPion  150 mg Oral Daily           Objective:   Vitals: /76   Pulse 92   Temp 98.2 °F (36.8 °C) (Temporal)   Resp 16   Wt 70.3 kg (155 lb)   LMP  (Approximate)   SpO2 99%   BMI 25.79 kg/m²  No intake or output data in the 24 hours ending 08/27/24 1359  General appearance: alert and cooperative with exam  Extremities: extremities normal, atraumatic, no cyanosis or edema  Neurologic:  No obvious focal neurologic deficits.    Assessment and Plan:   Principal Problem:    Depression with suicidal ideation  Active Problems:    Unspecified mood (affective) disorder (HCC)    Borderline personality disorder (HCC)  Resolved Problems:    * No resolved hospital problems. *    Vit D Def    Plan:   Continue present medication(s)    Follow with Psych   Replace Vit D      Discharge planning:    home     Reviewed treatment plans with the patient and/or family.             Electronically signed by Az Kraus MD on 8/27/2024 at 1:59 PM

## 2024-08-27 NOTE — PROGRESS NOTES
Group Note    Date: 08/27/24  Start Time: 8:00 AM   End Time:9:00 AM     Number of Participants: 8    Type of Group: Community/Goal     Patient's Goal:      Notes: No written goals    Status After Intervention:      Participation Level: Active Listener    Participation Quality: Appropriate    Speech:  normal    Thought Process/Content: Logical    Mood:  Calm    Level of consciousness:  Alert    Response to Learning: Able to verbalize current knowledge/experience    Modes of Intervention: Education and Support    Discipline Responsible: Behavioral Health Technician     Signature:  SHREE WEINSTEIN

## 2024-08-27 NOTE — PLAN OF CARE
Problem: Pain  Goal: Verbalizes/displays adequate comfort level or baseline comfort level  Outcome: Not Progressing     Problem: Anxiety  Goal: Will report anxiety at manageable levels  Description: INTERVENTIONS:  1. Administer medication as ordered  2. Teach and rehearse alternative coping skills  3. Provide emotional support with 1:1 interaction with staff  Outcome: Not Progressing  Flowsheets (Taken 8/27/2024 1248)  Will report anxiety at manageable levels:   Administer medication as ordered   Teach and rehearse alternative coping skills   Provide emotional support with 1:1 interaction with staff     Problem: Coping  Goal: Pt/Family able to verbalize concerns and demonstrate effective coping strategies  Description: INTERVENTIONS:  1. Assist patient/family to identify coping skills, available support systems and cultural and spiritual values  2. Provide emotional support, including active listening and acknowledgement of concerns of patient and caregivers  3. Reduce environmental stimuli, as able  4. Instruct patient/family in relaxation techniques, as appropriate  5. Assess for spiritual pain/suffering and initiate Spiritual Care, Psychosocial Clinical Specialist consults as needed  Outcome: Not Progressing  Flowsheets (Taken 8/27/2024 1248)  Patient/family able to verbalize anxieties, fears, and concerns, and demonstrate effective coping:   Assist patient/family to identify coping skills, available support systems and cultural and spiritual values   Provide emotional support, including active listening and acknowledgement of concerns of patient and caregivers   Reduce environmental stimuli, as able   Instruct patient/family in relaxation techniques, as appropriate   Assess for spiritual pain/suffering and initiate Spiritual Care, Psychosocial Clinical Specialist consults as needed     Problem: Decision Making  Goal: Pt/Family able to effectively weigh alternatives and participate in decision making related  agreement   Implement a Health Care Agreement if patient meets criteria   If a patient’s behavior jeopardizes the safety of the patient, staff, or others refer to organization policy. If a visitor’s behavior poses a threat to safety refer to organization policy   Initiate consult with , Psychosocial Clinical Nurse Specialist, Spiritual Care as appropriate     Problem: Depression/Self Harm  Goal: Effect of psychiatric condition will be minimized and patient will be protected from self harm  Description: INTERVENTIONS:  1. Assess impact of patient's symptoms on level of functioning, self care needs and offer support as indicated  2. Assess patient/family knowledge of depression, impact on illness and need for teaching  3. Provide emotional support, presence and reassurance  4. Assess for possible suicidal thoughts or ideation. If patient expresses suicidal thoughts or statements do not leave alone, initiate Suicide Precautions, move to a room close to the nursing station and obtain sitter  5. Initiate consults as appropriate with Mental Health Professional, Spiritual Care, Psychosocial CNS, and consider a recommendation to the [facility] for a Psychiatric Consultation  Outcome: Not Progressing  Flowsheets  Taken 8/27/2024 1303  Effect of psychiatric condition will be minimized and patient will be protected from self harm:   Assess impact of patient’s symptoms on level of functioning, self care needs and offer support as indicated   Assess patient/family knowledge of depression, impact on illness and need for teaching   Assess for suicidal thoughts or ideation. If patient expresses suicidal thoughts or statements do not leave alone, initiate Suicide Precautions, move near nurse station, obtain sitter   Provide emotional support, presence and reassurance   Initiate consults as appropriate with Mental Health Professional, Spiritual Care, Psychosocial CNS, and consider a recommendation to the [facility] for a Psychiatric

## 2024-08-27 NOTE — PROGRESS NOTES
Group Note    Date: 08/26/24  Start Time: 7:00 PM   End Time:7:30 PM     Number of Participants: 8    Type of Group: Wrap-Up     Patient's Goal:  none listed    Notes:  none listed    Status After Intervention:  Unchanged    Participation Level: Minimal    Participation Quality: Appropriate    Speech:  normal    Thought Process/Content: Logical    Mood: anxious and depressed    Level of consciousness:  Alert and Oriented x4    Response to Learning: Progressing to goal    Modes of Intervention: Education and Support    Discipline Responsible: Registered Nurse     Signature:  VIGNESH AMAYA RN

## 2024-08-27 NOTE — PROGRESS NOTES
Spiritual Health Assessment/Progress Note  [facility]    Loneliness/Social Isolation,  ,  ,      Name: Willem Ndiaye MRN: [id number]    Age: 24 y.o.     Sex: adult   Language: English   [place]: [employer]   Depression with suicidal ideation     Date: 8/27/2024            Total Time Calculated: 16 min              Spiritual Assessment began in [facility]        Referral/Consult From: Rounding   Encounter Overview/Reason: Loneliness/Social Isolation  Service Provided For: Patient    Zandra, Belief, Meaning:   Patient has beliefs or practices that help with coping during difficult times  Family/Friends       Importance and Influence:  Patient has spiritual/personal beliefs that influence decisions regarding their health  Family/Friends     Community:  Patient feels well-supported. Support system includes: Friends and Extended family  Family/Friends Expressed - also prefers to cope through his practices alone.    Assessment and Plan of Care:     Patient Interventions include: Personal practices of coping: watching movies, petting his cat and doing art.  Family/Friends Interventions include: Family/Friends Interventions    Patient Plan of Care: Other: To continue see therapist/doctor, adhere to medication  Family/Friends Plan of Care: See Flowsheet: Assessment/Intervention/Outcome    Electronically signed by WILI Chopra on 8/27/2024 at 12:25 PM        08/27/24 1205   Encounter Summary   Encounter Overview/Reason Loneliness/Social Isolation   Service Provided For Patient   Referral/Consult From Rounding   Support System Parent;Family members;Friends/neighbors   Complexity of Encounter Moderate   Begin Time 1154   End Time  1210   Total Time Calculated 16 min   Spiritual/Emotional needs   Type Spiritual Support   Assessment/Intervention/Outcome   Assessment Coping  (Pt expressed \"Being upset\" re. his meds...)   Intervention Active listening;Explored/Affirmed feelings, thoughts,  concerns  (Encouraged to talk)   Outcome Engaged in conversation;Expressed feelings, needs, and concerns  (Pt stated - wish to let family, friends and others know that he is really trying hard to attend to his health like seeing his doctor, open to seek help; yet, he also has ways of coping by himself.)   Plan and Referrals   Plan/Referrals No future visits requested   Does the patient have a [facility] PCP? Yes    to follow up after discharge? No     Electronically signed by DHIRAJ Chopra on 8/27/2024 at 12:33 PM

## 2024-08-27 NOTE — PROGRESS NOTES
[facility]      Psychiatric Progress Note    Name:  Willem Ndiaye  Date:  8/27/2024  Age:  24 y.o.  Sex:  adult  Ethnicity:   Primary Care Physician:  Miracle Escobar MD   Patient Care Team:  Patient Care Team:  Miracle Escobar MD as PCP - General (Family Medicine)  Miracle Escobar MD as PCP - Empaneled Provider  Chief Complaint: \" I am feeling better now.\"        Historian:patient  Complaint Type: anxiety, depression, loss of interest in favorite activities, mood swings, sleep disturbance, and tobacco use  Course of Symptoms: ongoing  Precipitating Factors: history of mental illness, cannabis use disorder         Subjective  Nursing notes were reviewed and patient had no behavioral issues during the night. As needed medications administered during the night include Atarax, Melatonin and Trazodone. Today he reports this morning he had passive suicidal ideations. He reports that currently he is not having any suicidal ideations, homicidal ideations or psychosis. He reports that he is looking forward to trauma focused CBT after he is discharged. He has been out in the milieu putting a puzzle together with other peers. He rates depression as a 4 and anxiety as a 2 on a 0-10 scale.   Patient reports sleep as, \"I didn't sleep that well last night.\" Patient has been calm and cooperative with staff and peers. Patient has been compliant with medications. Patient has been attending groups. Patient reports appetite as, \"I am eating normally.\" Patient reports no side effects from medications.      Objective  Vitals:    08/27/24 0827   BP: 123/76   Pulse: 92   Resp: 16   Temp: 98.2 °F (36.8 °C)   SpO2: 99%       Previous Psychiatric/Substance Use History      Medical History:  Past Medical History:   Diagnosis Date    ADHD (attention deficit hyperactivity disorder)     Anxiety     Depression     Personality disorder, depressive     Self mutilating behavior
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
## 2024-08-27 NOTE — PLAN OF CARE
Group Therapy Note     Date: 8/27/2024  Start Time: 1100  End Time:  1130  Number of Participants: 6     Type of Group: Psychoeducation     Wellness Binder Information  Module Name:  emotional wellness  Session Number:  5     Patient's Goal:  obstacles to emotional wellness     Notes:  pt was verbally prompted to attend group. Pt refused. Information about obstacles to wellness was provided.     Status After Intervention:       Participation Level:      Participation Quality:         Speech:          Thought Process/Content:         Affective Functioning:         Mood:         Level of consciousness:          Response to Learning:         Endings:      Modes of Intervention:         Discipline Responsible: Psychoeducational Specialist        Signature:  Lauren Cam

## 2024-08-27 NOTE — PROGRESS NOTES
[facility] Adult Unit Daily Assessment  Nursing Progress Note    Room: [facility]625A-   Name: Willem Ndiaye   Age: 24 y.o.   Gender: adult   Dx: Depression with suicidal ideation  Precautions: close watch and suicide risk  Inpatient Status: voluntary     SLEEP:  Sleep Quality Poor  Sleep Medications: Yes   PRN Sleep Meds: Yes     MEDICAL:  Other PRN Meds: Yes,Atarax   Med Compliant: No  Accu-Chek: No  Oxygen/CPAP/BiPAP: No  CIWA/CINA: No   PAIN Assessment: none  Side Effects from medication: No    Metabolic Screening:  Lab Results   Component Value Date    LABA1C 5.4 01/31/2024     Lab Results   Component Value Date    CHOL 115 (L) 01/31/2024    CHOL 122 10/02/2014     Lab Results   Component Value Date    TRIG 69 01/31/2024    TRIG 113 10/02/2014     Lab Results   Component Value Date    HDL 42 (L) 01/31/2024    HDL 41 10/02/2014     No components found for: \"LDLCAL\"  No components found for: \"LABVLDL\"  Body mass index is 25.79 kg/m².  BP Readings from Last 2 Encounters:   08/27/24 123/76   05/16/24 136/84       Medical Bed:   Is patient in a medical bed? no   If medical bed is in use, has nursing secured room while patient is awake and out of the room? NA  Has safety checks by nursing been completed on the bed/room this shift? yes    Protective Factors:  Patient identifies protective factors with nursing staff as follows:   Identifies reasons for living: Yes   Supportive Social Network or family: Yes    Belief that suicide is immoral/high spirituality: Yes   Responsibility to family or others/living with family: Yes   Fear of death or dying due to pain and suffering: Yes   Engaged in work or school: Yes  If Patient is unable to identify, reason why?     Depression: 5   Anxiety: 5   SI denies suicidal ideation   Risk of Suicide: No Risk  HI Negative for homicidal ideation        AVH:yes If Hallucinations are present, describe? Feels like he is being touched when lying in bed    Appetite: good   Percent Meals: 75%    Social: Yes   Speech: normal   Appearance: appropriately dressed and healthy looking    GROUP:  Group Participation: Yes  Participation Quality: Active Listener    Notes:   ALOx4, slept poorly last night, appetite good, med compliant.  Attends group.  Pt is withdrawn, tearful this morning.  Pt was witnessed lying in floor next to his bed.  Pt reports having tactile hallucinations feeling someone touching him.  Doesn't feel it as much when he is not in bed.  Administered prn Atarax to help with anxiety.  Pt in day area with peers at present time.  Not as withdrawn as earlier in day.  Rates depression and anxiety 5/10,denies SI, HI, tactile hallucinations , denies AVH.  Will continue to monitor.      Electronically signed by Dillan Barrera RN on 8/27/24 at 1:30 PM CDT

## 2024-08-28 VITALS
TEMPERATURE: 97.7 F | OXYGEN SATURATION: 98 % | DIASTOLIC BLOOD PRESSURE: 70 MMHG | BODY MASS INDEX: 25.79 KG/M2 | WEIGHT: 155 LBS | SYSTOLIC BLOOD PRESSURE: 116 MMHG | RESPIRATION RATE: 18 BRPM | HEART RATE: 92 BPM

## 2024-08-28 PROCEDURE — 6370000000 HC RX 637 (ALT 250 FOR IP): Performed by: PSYCHIATRY & NEUROLOGY

## 2024-08-28 PROCEDURE — 99239 HOSP IP/OBS DSCHRG MGMT >30: CPT | Performed by: NURSE PRACTITIONER

## 2024-08-28 PROCEDURE — 6370000000 HC RX 637 (ALT 250 FOR IP): Performed by: NURSE PRACTITIONER

## 2024-08-28 PROCEDURE — 5130000000 HC BRIDGE APPOINTMENT

## 2024-08-28 PROCEDURE — 6370000000 HC RX 637 (ALT 250 FOR IP): Performed by: HOSPITALIST

## 2024-08-28 RX ORDER — BUPROPION HYDROCHLORIDE 150 MG/1
150 TABLET ORAL EVERY MORNING
COMMUNITY

## 2024-08-28 RX ORDER — ERGOCALCIFEROL 1.25 MG/1
50000 CAPSULE ORAL WEEKLY
Qty: 5 CAPSULE | Refills: 0 | Status: SHIPPED | OUTPATIENT
Start: 2024-08-28 | End: 2024-11-07

## 2024-08-28 RX ORDER — TRAZODONE HYDROCHLORIDE 50 MG/1
50 TABLET, FILM COATED ORAL NIGHTLY PRN
Qty: 30 TABLET | Refills: 0 | Status: SHIPPED | OUTPATIENT
Start: 2024-08-28

## 2024-08-28 RX ORDER — HYDROXYZINE HYDROCHLORIDE 25 MG/1
25 TABLET, FILM COATED ORAL 3 TIMES DAILY PRN
Qty: 30 TABLET | Refills: 0 | Status: SHIPPED | OUTPATIENT
Start: 2024-08-28 | End: 2024-09-07

## 2024-08-28 RX ADMIN — BUPROPION HYDROCHLORIDE 150 MG: 150 TABLET, EXTENDED RELEASE ORAL at 08:01

## 2024-08-28 RX ADMIN — Medication 1 TABLET: at 08:01

## 2024-08-28 RX ADMIN — LAMOTRIGINE 100 MG: 100 TABLET ORAL at 08:01

## 2024-08-28 NOTE — PLAN OF CARE
Problem: Coping  Goal: Pt/Family able to verbalize concerns and demonstrate effective coping strategies  Description: INTERVENTIONS:  1. Assist patient/family to identify coping skills, available support systems and cultural and spiritual values  2. Provide emotional support, including active listening and acknowledgement of concerns of patient and caregivers  3. Reduce environmental stimuli, as able  4. Instruct patient/family in relaxation techniques, as appropriate  5. Assess for spiritual pain/suffering and initiate Spiritual Care, Psychosocial Clinical Specialist consults as needed  Outcome: Progressing  Note:                                                                     Group Therapy Note    Date: 8/28/2024  Start Time: 1000  End Time:  1030  Number of Participants: 10    Type of Group: Psychoeducation    Wellness Binder Information  Module Name:  Mental Health Wellness  Session Number:  1    Group Goal for Pt:  To improve knowledge of practical facts about depression    Notes:  Pt demonstrated improved knowledge of practical facts about depression by actively participating in group activity.    Status After Intervention:  Unchanged    Participation Level: Active Listener and Interactive    Participation Quality: Appropriate and Attentive      Speech:  normal      Thought Process/Content: Logical      Affective Functioning: Congruent      Mood: anxious and depressed      Level of consciousness:  Alert and Oriented x4      Response to Learning: Able to verbalize current knowledge/experience, Able to verbalize/acknowledge new learning, and Progressing to goal      Endings: None Reported    Modes of Intervention: Education      Discipline Responsible: Psychoeducational Specialist      Signature:  Elsa Sarabia

## 2024-08-28 NOTE — PROGRESS NOTES
[facility] Adult Unit Daily Assessment  Nursing Progress Note    Room: [facility]625A-   Name: Wlilem Ndiaye   Age: 24 y.o.   Gender: adult   Dx: Depression with suicidal ideation  Precautions: close watch and suicide risk  Inpatient Status: voluntary     SLEEP:  Sleep Quality Poor  Sleep Medications: Yes   PRN Sleep Meds: Yes     MEDICAL:  Other PRN Meds: No   Med Compliant: Yes  Accu-Chek: No  Oxygen/CPAP/BiPAP: No  CIWA/CINA: No   PAIN Assessment: none  Side Effects from medication: No    Metabolic Screening:  Lab Results   Component Value Date    LABA1C 5.4 01/31/2024     Lab Results   Component Value Date    CHOL 115 (L) 01/31/2024    CHOL 122 10/02/2014     Lab Results   Component Value Date    TRIG 69 01/31/2024    TRIG 113 10/02/2014     Lab Results   Component Value Date    HDL 42 (L) 01/31/2024    HDL 41 10/02/2014     No components found for: \"LDLCAL\"  No components found for: \"LABVLDL\"  Body mass index is 25.79 kg/m².  BP Readings from Last 2 Encounters:   08/27/24 123/76   05/16/24 136/84       Medical Bed:   Is patient in a medical bed? no   If medical bed is in use, has nursing secured room while patient is awake and out of the room? NA  Has safety checks by nursing been completed on the bed/room this shift? NA    Protective Factors:  Patient identifies protective factors with nursing staff as follows:   Identifies reasons for living: Yes   Supportive Social Network or family: Yes    Belief that suicide is immoral/high spirituality: Yes   Responsibility to family or others/living with family: Yes   Fear of death or dying due to pain and suffering: Yes   Engaged in work or school: Yes  If Patient is unable to identify, reason why? N/A    Depression: 2   Anxiety: 0   SI denies suicidal ideation   Risk of Suicide: No Risk  HI Negative for homicidal ideation        AVH:no If Hallucinations are present, describe?     Appetite: good   Percent Meals: 75%   Social: Yes   Speech: normal   Appearance: appropriately  dressed and healthy looking    GROUP:  Group Participation: Yes  Participation Quality: Interactive    Notes:   Patient observed watching television with peers.  He is AAO X 4, calm and cooperative with staff.  He has been compliant with medications and has participated in group wrap up.  He currently denies SI,HI, and AVH.      Electronically signed by Poppy Goodman LPN on 8/28/24 at 12:30 AM MECHELLET
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
## 2024-08-28 NOTE — PROGRESS NOTES
Group Note    Date: 08/27/24  Start Time: 7:30 PM   End Time:8:00 PM     Number of Participants: 10    Type of Group: Wrap-Up     Patient's Goal:      Notes:  see wrap up sheet    Status After Intervention:  Unchanged    Participation Level: Active Listener    Participation Quality: Appropriate    Speech:  normal    Thought Process/Content: Logical    Mood: anxious    Level of consciousness:  Alert and Oriented x4    Response to Learning: Able to verbalize current knowledge/experience    Modes of Intervention: Education and Support    Discipline Responsible: Registered Nurse     Signature:  Amada Daley RN

## 2024-08-28 NOTE — PROGRESS NOTES
Risk   HI? Negative for homicidal ideation       AVH? Negative    Status EXAM upon discharge:  Mental Status and Behavioral Exam  Normal: Yes  Level of Assistance: Independent/Self  Facial Expression: Brightened  Affect: Appropriate  Level of Consciousness: Alert  Frequency of Checks: 4 times per hour, close  Mood:Normal: Yes  Mood: Depressed  Motor Activity:Normal: Yes  Motor Activity: Decreased  Eye Contact: Good  Observed Behavior: Cooperative, Friendly  Sexual Misconduct History: Current - no  Preception: [place] to person, [place] to time, [place] to place, [place] to situation  Attention:Normal: No  Attention: Distractible  Thought Processes: Other (comment) (linear)  Thought Content:Normal: Yes  Thought Content: Preoccupations  Depression Symptoms: No problems reported or observed.  Anxiety Symptoms: Generalized  Nadeen Symptoms: No problems reported or observed.  Hallucinations: None  Delusions: No  Delusions: Controlled  Memory:Normal: Yes  Memory: Poor recent  Insight and Judgment: Yes  Insight and Judgment: Poor judgment, Poor insight    AVS/Transition Record has been discussed with patient and a copy was given to the patient.  The AVS/Transition Record was faxed to the next level of care today.

## 2024-08-28 NOTE — PLAN OF CARE
Problem: Coping  Goal: Pt/Family able to verbalize concerns and demonstrate effective coping strategies  8/28/2024 1137 by Lauren Cam  Outcome: Progressing    Group Therapy Note     Date: 8/28/2024  Start Time: 1100  End Time:  1130  Number of Participants: 9     Type of Group: Psychotherapy     Wellness Binder Information  Module Name:  staying well  Session Number:  1     Patient's Goal:  daily maintenance and coping skills     Notes:  pt acknowledged use of positive coping skills daily to help stay well.     Status After Intervention:  Improved     Participation Level: Interactive     Participation Quality: Appropriate, Attentive, and Sharing        Speech:  normal        Thought Process/Content: Logical        Affective Functioning: Congruent        Mood: congruent        Level of consciousness:  Alert, Oriented x4, and Attentive        Response to Learning: Able to verbalize current knowledge/experience        Endings: None Reported     Modes of Intervention: Education        Discipline Responsible: Psychoeducational Specialist        Signature:  Lauren Cam

## 2024-08-28 NOTE — DISCHARGE INSTR - DIET

## 2024-08-29 ENCOUNTER — FOLLOWUP TELEPHONE ENCOUNTER (OUTPATIENT)
Dept: PSYCHIATRY | Age: 24
End: 2024-08-29

## 2024-08-29 NOTE — TELEPHONE ENCOUNTER
SW attempted to follow up with patient post discharge. SW left a voicemail encouraging patient to attend follow up and take medication as direct.     OMAIRA attempted the second time. SW spoke with patient whom confirmed they are doing well with no further questions.

## 2024-08-29 NOTE — PROGRESS NOTES
Discharge Note     Patient is discharging on this date. Patient denies SI, HI, and AVH at this time. Patient reports improvement in behavior and is leaving unit in overall good condition. SW and patient discussed patient's follow up appointments and importance of attending appointments as scheduled, patient voiced understanding and agreement. Patient and SW also discussed patient's safety plan and patient was able to verbally identify: warning signs, coping strategies, places and people that help make the patient feel better/distract negative thoughts, friends/family/agencies/professionals the patient can reach out to in a crisis, and something that is important to the patient/worth living for. Patient was provided the national suicide prevention hotline number (1-895.817.2851) as well as local community behavioral health ([facility]) crisis number for emergencies (1-264.945.1528).     Discharge Disposition: home -lives alone      Pt to follow up with:  [facility]  on September  3 , 2024 at 11:30 AM for the intake appointment. Patient will follow up with [facility]   On September 16 , 2024   at 2:01 PM for the medication management appointment.     Referral to outpatient tobacco cessation counseling treatment:  Patient refused referral to outpatient tobacco cessation counseling    SW offered to assist patient with transportation, patient declined transportation assistance, his mom came to pick him up.

## 2024-08-29 NOTE — PROGRESS NOTES
Progress Note  Willem Ndiaye  8/29/2024 9:12 AM  Subjective:   Admit Date:   8/24/2024      CC/ADMIT DX:       Interval History:   Reviewed overnight events and nursing notes.  He has had no new medical issues.     I have reviewed all labs/diagnostics from the last 24hrs.       ROS:   I have done a 10 point ROS and all are negative, except what is mentioned in the HPI.    No diet orders on file    Medications:               Objective:   Vitals: /70   Pulse 92   Temp 97.7 °F (36.5 °C)   Resp 18   Wt 70.3 kg (155 lb)   LMP  (Approximate)   SpO2 98%   BMI 25.79 kg/m²  No intake or output data in the 24 hours ending 08/29/24 0912  General appearance: alert and cooperative with exam  Extremities: extremities normal, atraumatic, no cyanosis or edema  Neurologic:  No obvious focal neurologic deficits.    Assessment and Plan:   Principal Problem:    Unspecified mood (affective) disorder (HCC)  Active Problems:    Depression with suicidal ideation    Borderline personality disorder (HCC)  Resolved Problems:    * No resolved hospital problems. *    Vit D Def    Plan:   Continue present medication(s)    Follow with Psych   He is medically stable. I will monitor for any changes or concerns.       Discharge planning:    home     Reviewed treatment plans with the patient and/or family.             Electronically signed by Az Kraus MD on 8/29/2024 at 9:12 AM

## 2024-08-29 NOTE — CARE COORDINATION
SW attempted the second time. SW spoke with patient whom confirmed they are doing well with no further questions.

## 2024-08-29 NOTE — CARE COORDINATION
OMAIRA attempted to follow up with patient post discharge. SW left a voicemail encouraging patient to attend follow up and take medication as direct.

## 2025-03-17 ENCOUNTER — OFFICE VISIT (OUTPATIENT)
Dept: PRIMARY CARE CLINIC | Age: 25
End: 2025-03-17
Payer: COMMERCIAL

## 2025-03-17 VITALS
SYSTOLIC BLOOD PRESSURE: 122 MMHG | HEIGHT: 65 IN | WEIGHT: 151 LBS | OXYGEN SATURATION: 98 % | HEART RATE: 97 BPM | BODY MASS INDEX: 25.16 KG/M2 | TEMPERATURE: 98.2 F | DIASTOLIC BLOOD PRESSURE: 80 MMHG | RESPIRATION RATE: 16 BRPM

## 2025-03-17 DIAGNOSIS — N92.6 IRREGULAR MENSES: ICD-10-CM

## 2025-03-17 DIAGNOSIS — R42 DIZZINESS: Primary | ICD-10-CM

## 2025-03-17 DIAGNOSIS — N94.6 DYSMENORRHEA: ICD-10-CM

## 2025-03-17 LAB
ALBUMIN SERPL-MCNC: 4.5 G/DL (ref 3.5–5.2)
ALP SERPL-CCNC: 87 U/L (ref 35–104)
ALT SERPL-CCNC: 200 U/L (ref 10–35)
ANION GAP SERPL CALCULATED.3IONS-SCNC: 14 MMOL/L (ref 8–16)
AST SERPL-CCNC: 188 U/L (ref 10–35)
BASOPHILS # BLD: 0 K/UL (ref 0–0.2)
BASOPHILS NFR BLD: 0.3 % (ref 0–1)
BILIRUB SERPL-MCNC: 1 MG/DL (ref 0.2–1.2)
BUN SERPL-MCNC: 8 MG/DL (ref 6–20)
CALCIUM SERPL-MCNC: 9.4 MG/DL (ref 8.6–10)
CHLORIDE SERPL-SCNC: 99 MMOL/L (ref 98–107)
CO2 SERPL-SCNC: 25 MMOL/L (ref 22–29)
CREAT SERPL-MCNC: 1 MG/DL (ref 0.5–0.9)
EOSINOPHIL # BLD: 0 K/UL (ref 0–0.6)
EOSINOPHIL NFR BLD: 0.2 % (ref 0–5)
ERYTHROCYTE [DISTWIDTH] IN BLOOD BY AUTOMATED COUNT: 12.8 % (ref 11.5–14.5)
GLUCOSE SERPL-MCNC: 74 MG/DL (ref 70–99)
HCT VFR BLD AUTO: 42.7 % (ref 37–47)
HGB BLD-MCNC: 14 G/DL (ref 12–16)
IMM GRANULOCYTES # BLD: 0 K/UL
LYMPHOCYTES # BLD: 1.3 K/UL (ref 1.1–4.5)
LYMPHOCYTES NFR BLD: 21.9 % (ref 20–40)
MCH RBC QN AUTO: 30.6 PG (ref 27–31)
MCHC RBC AUTO-ENTMCNC: 32.8 G/DL (ref 33–37)
MCV RBC AUTO: 93.4 FL (ref 81–99)
MONOCYTES # BLD: 0.4 K/UL (ref 0–0.9)
MONOCYTES NFR BLD: 7.4 % (ref 0–10)
NEUTROPHILS # BLD: 4.1 K/UL (ref 1.5–7.5)
NEUTS SEG NFR BLD: 70 % (ref 50–65)
PLATELET # BLD AUTO: 262 K/UL (ref 130–400)
PMV BLD AUTO: 9 FL (ref 9.4–12.3)
POTASSIUM SERPL-SCNC: 4.2 MMOL/L (ref 3.5–5.1)
PROT SERPL-MCNC: 7.6 G/DL (ref 6.4–8.3)
RBC # BLD AUTO: 4.57 M/UL (ref 4.2–5.4)
SODIUM SERPL-SCNC: 138 MMOL/L (ref 136–145)
TSH SERPL DL<=0.005 MIU/L-ACNC: 0.87 UIU/ML (ref 0.27–4.2)
WBC # BLD AUTO: 5.9 K/UL (ref 4.8–10.8)

## 2025-03-17 PROCEDURE — 99214 OFFICE O/P EST MOD 30 MIN: CPT | Performed by: FAMILY MEDICINE

## 2025-03-17 PROCEDURE — 93000 ELECTROCARDIOGRAM COMPLETE: CPT | Performed by: FAMILY MEDICINE

## 2025-03-17 RX ORDER — DEXTROAMPHETAMINE SACCHARATE, AMPHETAMINE ASPARTATE, DEXTROAMPHETAMINE SULFATE AND AMPHETAMINE SULFATE 7.5; 7.5; 7.5; 7.5 MG/1; MG/1; MG/1; MG/1
30 TABLET ORAL DAILY
COMMUNITY
Start: 2025-01-21

## 2025-03-17 RX ORDER — NORETHINDRONE ACETATE AND ETHINYL ESTRADIOL 1MG-20(21)
1 KIT ORAL DAILY
Qty: 1 PACKET | Refills: 3 | Status: SHIPPED | OUTPATIENT
Start: 2025-03-17

## 2025-03-17 ASSESSMENT — PATIENT HEALTH QUESTIONNAIRE - PHQ9
6. FEELING BAD ABOUT YOURSELF - OR THAT YOU ARE A FAILURE OR HAVE LET YOURSELF OR YOUR FAMILY DOWN: NOT AT ALL
9. THOUGHTS THAT YOU WOULD BE BETTER OFF DEAD, OR OF HURTING YOURSELF: NOT AT ALL
8. MOVING OR SPEAKING SO SLOWLY THAT OTHER PEOPLE COULD HAVE NOTICED. OR THE OPPOSITE, BEING SO FIGETY OR RESTLESS THAT YOU HAVE BEEN MOVING AROUND A LOT MORE THAN USUAL: SEVERAL DAYS
SUM OF ALL RESPONSES TO PHQ QUESTIONS 1-9: 5
SUM OF ALL RESPONSES TO PHQ QUESTIONS 1-9: 5
7. TROUBLE CONCENTRATING ON THINGS, SUCH AS READING THE NEWSPAPER OR WATCHING TELEVISION: SEVERAL DAYS
4. FEELING TIRED OR HAVING LITTLE ENERGY: SEVERAL DAYS
2. FEELING DOWN, DEPRESSED OR HOPELESS: SEVERAL DAYS
3. TROUBLE FALLING OR STAYING ASLEEP: SEVERAL DAYS
SUM OF ALL RESPONSES TO PHQ QUESTIONS 1-9: 5
SUM OF ALL RESPONSES TO PHQ QUESTIONS 1-9: 5
5. POOR APPETITE OR OVEREATING: NOT AT ALL
1. LITTLE INTEREST OR PLEASURE IN DOING THINGS: NOT AT ALL
10. IF YOU CHECKED OFF ANY PROBLEMS, HOW DIFFICULT HAVE THESE PROBLEMS MADE IT FOR YOU TO DO YOUR WORK, TAKE CARE OF THINGS AT HOME, OR GET ALONG WITH OTHER PEOPLE: SOMEWHAT DIFFICULT

## 2025-03-18 ENCOUNTER — RESULTS FOLLOW-UP (OUTPATIENT)
Dept: PRIMARY CARE CLINIC | Age: 25
End: 2025-03-18

## 2025-03-18 DIAGNOSIS — R74.8 ELEVATED LIVER ENZYMES: Primary | ICD-10-CM

## 2025-03-18 ASSESSMENT — ENCOUNTER SYMPTOMS
COUGH: 0
ABDOMINAL PAIN: 0
NAUSEA: 0
DIARRHEA: 0
COLOR CHANGE: 0
RHINORRHEA: 0
VOMITING: 0
CONSTIPATION: 0

## 2025-03-18 NOTE — PROGRESS NOTES
supple.   Skin:     General: Skin is warm and dry.   Neurological:      General: No focal deficit present.      Mental Status: He is alert and oriented to person, place, and time. Mental status is at baseline.   Psychiatric:         Mood and Affect: Mood normal.         Behavior: Behavior normal.         Thought Content: Thought content normal.         Judgment: Judgment normal.        /80   Pulse 97   Temp 98.2 °F (36.8 °C) (Temporal)   Resp 16   Ht 1.651 m (5' 5\")   Wt 68.5 kg (151 lb)   SpO2 98%   BMI 25.13 kg/m²      ASSESSMENT/PLAN:    1. Dizziness  -     EKG 12 lead  -     CBC with Auto Differential  -     Comprehensive Metabolic Panel  -     TSH  2. Dysmenorrhea  3. Irregular menses       EKG was performed in office today which showed no acute findings.  Labs ordered today to assess for the dizziness due to the heavy menses.  Will notify them of the results.  Oral OCPs been sent to the pharmacy.  If symptoms are not improving with the menses and dysmenorrhea they are to let me know    PDMP Monitoring:    Last PDMP Yasir as Reviewed ([place]):  Review User Review Instant Review Result   ANGEL MORSE 9/26/2023 11:09 AM Reviewed PDMP [1]       Urine Drug Screenings (1 yr)       Drug Panel 9, Ur Screen w Reflx to Conf  Collected: 5/8/2023  4:02 PM (Final result)                  Medication Contract and Consent for Opioid Use Documents Filed        No documents found                     EMR Dragon/transcription disclaimer:  Much of this encounter note is electronic transcription/translation of spoken language toprinted texts.  The electronic translation of spoken language may be erroneous, or at times, nonsensical words or phrases may be inadvertently transcribed.  Although I have reviewed the note for such errors, some may stillexist.

## 2025-03-24 ENCOUNTER — OFFICE VISIT (OUTPATIENT)
Dept: PRIMARY CARE CLINIC | Age: 25
End: 2025-03-24
Payer: COMMERCIAL

## 2025-03-24 VITALS
HEART RATE: 74 BPM | OXYGEN SATURATION: 98 % | RESPIRATION RATE: 16 BRPM | SYSTOLIC BLOOD PRESSURE: 118 MMHG | DIASTOLIC BLOOD PRESSURE: 80 MMHG | TEMPERATURE: 97.8 F

## 2025-03-24 DIAGNOSIS — R42 DIZZINESS: ICD-10-CM

## 2025-03-24 DIAGNOSIS — N92.6 IRREGULAR MENSES: ICD-10-CM

## 2025-03-24 DIAGNOSIS — R74.8 ELEVATED LIVER ENZYMES: Primary | ICD-10-CM

## 2025-03-24 DIAGNOSIS — R53.83 FATIGUE, UNSPECIFIED TYPE: ICD-10-CM

## 2025-03-24 LAB
ALBUMIN SERPL-MCNC: 4.4 G/DL (ref 3.5–5.2)
ALP SERPL-CCNC: 63 U/L (ref 35–104)
ALT SERPL-CCNC: 187 U/L (ref 10–35)
AST SERPL-CCNC: 83 U/L (ref 10–35)
BASOPHILS # BLD: 0 K/UL (ref 0–0.2)
BASOPHILS NFR BLD: 0.3 % (ref 0–1)
BILIRUB DIRECT SERPL-MCNC: <0.1 MG/DL (ref 0–0.3)
BILIRUB INDIRECT SERPL-MCNC: 0.2 MG/DL (ref 0–1)
BILIRUB SERPL-MCNC: 0.3 MG/DL (ref 0.2–1.2)
EBV VCA AB SER QL: NEGATIVE
EOSINOPHIL # BLD: 0 K/UL (ref 0–0.6)
EOSINOPHIL NFR BLD: 0.2 % (ref 0–5)
ERYTHROCYTE [DISTWIDTH] IN BLOOD BY AUTOMATED COUNT: 12.4 % (ref 11.5–14.5)
HAV IGM SERPL QL IA: NONREACTIVE
HBV CORE IGM SERPL QL IA: NONREACTIVE
HBV SURFACE AG SERPL QL IA: NORMAL
HCT VFR BLD AUTO: 43.2 % (ref 37–47)
HCV AB SERPL QL IA: NORMAL
HGB BLD-MCNC: 14.1 G/DL (ref 12–16)
IMM GRANULOCYTES # BLD: 0 K/UL
LYMPHOCYTES # BLD: 1.4 K/UL (ref 1.1–4.5)
LYMPHOCYTES NFR BLD: 24.6 % (ref 20–40)
MCH RBC QN AUTO: 30.6 PG (ref 27–31)
MCHC RBC AUTO-ENTMCNC: 32.6 G/DL (ref 33–37)
MCV RBC AUTO: 93.7 FL (ref 81–99)
MONOCYTES # BLD: 0.6 K/UL (ref 0–0.9)
MONOCYTES NFR BLD: 10.2 % (ref 0–10)
NEUTROPHILS # BLD: 3.8 K/UL (ref 1.5–7.5)
NEUTS SEG NFR BLD: 64.5 % (ref 50–65)
PLATELET # BLD AUTO: 289 K/UL (ref 130–400)
PMV BLD AUTO: 9.3 FL (ref 9.4–12.3)
PROT SERPL-MCNC: 7 G/DL (ref 6.4–8.3)
RBC # BLD AUTO: 4.61 M/UL (ref 4.2–5.4)
WBC # BLD AUTO: 5.9 K/UL (ref 4.8–10.8)

## 2025-03-24 PROCEDURE — 99213 OFFICE O/P EST LOW 20 MIN: CPT | Performed by: FAMILY MEDICINE

## 2025-03-24 ASSESSMENT — ENCOUNTER SYMPTOMS
COUGH: 0
RHINORRHEA: 0
VOMITING: 0
NAUSEA: 0
CONSTIPATION: 0
COLOR CHANGE: 0
DIARRHEA: 0
ABDOMINAL PAIN: 0

## 2025-03-24 NOTE — PROGRESS NOTES
SUBJECTIVE:    Patient ID: Willem Ndiaye is a 24 y.o. adult.    History of Present Illness  The patient presents for evaluation of dizziness.    A worsening of dizziness is reported, which is less severe when seated but persists regardless. The dizziness does not occur during positional changes in bed unless already experiencing it prior to lying down. Nausea has been present, predating the initiation of her birth control regimen. Despite the nausea, food and water intake has increased, recognizing that reduced consumption exacerbates symptoms. No recent illness, including fevers or chills, has been experienced, and there has been no COVID-19. No recent changes in medication dosage or frequency are noted. Ear fullness, diarrhea, or pain have not been experienced. No recent tick bites, travel, or exposure to sick individuals are reported. Antihistamines or Flonase have not been taken. Vomiting occurred once in the past week. Bowel movements are normal. Sleeping more than usual is reported, and there is no history of mononucleosis.    GYNECOLOGICAL HISTORY:  - Last Menstrual Period: Ended approximately 8 to 9 days ago      Past Medical History:   Diagnosis Date    ADHD (attention deficit hyperactivity disorder)     Anxiety     Depression     Personality disorder, depressive     Self mutilating behavior     Tobacco abuse       Current Outpatient Medications on File Prior to Visit   Medication Sig Dispense Refill    Lurasidone HCl (LATUDA PO) Take by mouth      amphetamine-dextroamphetamine (ADDERALL) 30 MG tablet Take 1 tablet by mouth daily.      norethindrone-ethinyl estradiol (LOESTRIN FE 1/20) 1-20 MG-MCG per tablet Take 1 tablet by mouth daily 1 packet 3    traZODone (DESYREL) 50 MG tablet Take 1 tablet by mouth nightly as needed for Sleep 30 tablet 0    buPROPion (WELLBUTRIN XL) 150 MG extended release tablet Take 1 tablet by mouth every morning      lamoTRIgine (LAMICTAL) 100 MG tablet Take 1 tablet by mouth

## 2025-03-25 ENCOUNTER — RESULTS FOLLOW-UP (OUTPATIENT)
Dept: PRIMARY CARE CLINIC | Age: 25
End: 2025-03-25

## 2025-03-25 DIAGNOSIS — R74.8 ELEVATED LIVER ENZYMES: Primary | ICD-10-CM

## 2025-03-28 LAB
CMV IGG SERPL IA-ACNC: 0.34 U/ML
CMV IGM SERPL IA-ACNC: 13.2 AU/ML

## 2025-04-23 RX ORDER — NORETHINDRONE ACETATE AND ETHINYL ESTRADIOL AND FERROUS FUMARATE 1MG-20(21)
1 KIT ORAL DAILY
Qty: 1 PACKET | Refills: 3 | Status: SHIPPED | OUTPATIENT
Start: 2025-04-23

## 2025-05-29 RX ORDER — NORETHINDRONE ACETATE AND ETHINYL ESTRADIOL 1MG-20(21)
1 KIT ORAL DAILY
Qty: 1 PACKET | Refills: 3 | Status: SHIPPED | OUTPATIENT
Start: 2025-05-29

## 2025-06-30 RX ORDER — NORETHINDRONE ACETATE AND ETHINYL ESTRADIOL 1MG-20(21)
1 KIT ORAL DAILY
Qty: 3 PACKET | Refills: 3 | Status: SHIPPED | OUTPATIENT
Start: 2025-06-30